# Patient Record
Sex: FEMALE | Race: ASIAN | NOT HISPANIC OR LATINO
[De-identification: names, ages, dates, MRNs, and addresses within clinical notes are randomized per-mention and may not be internally consistent; named-entity substitution may affect disease eponyms.]

---

## 2018-03-01 ENCOUNTER — APPOINTMENT (OUTPATIENT)
Dept: CARDIOLOGY | Facility: CLINIC | Age: 68
End: 2018-03-01
Payer: MEDICARE

## 2018-03-01 ENCOUNTER — NON-APPOINTMENT (OUTPATIENT)
Age: 68
End: 2018-03-01

## 2018-03-01 VITALS
HEIGHT: 59 IN | BODY MASS INDEX: 25.6 KG/M2 | HEART RATE: 55 BPM | OXYGEN SATURATION: 98 % | SYSTOLIC BLOOD PRESSURE: 134 MMHG | WEIGHT: 127 LBS | DIASTOLIC BLOOD PRESSURE: 78 MMHG

## 2018-03-01 DIAGNOSIS — Z82.49 FAMILY HISTORY OF ISCHEMIC HEART DISEASE AND OTHER DISEASES OF THE CIRCULATORY SYSTEM: ICD-10-CM

## 2018-03-01 PROCEDURE — 99204 OFFICE O/P NEW MOD 45 MIN: CPT

## 2018-03-01 PROCEDURE — 93306 TTE W/DOPPLER COMPLETE: CPT

## 2018-03-01 PROCEDURE — 93000 ELECTROCARDIOGRAM COMPLETE: CPT

## 2018-03-19 ENCOUNTER — APPOINTMENT (OUTPATIENT)
Dept: CARDIOLOGY | Facility: CLINIC | Age: 68
End: 2018-03-19

## 2018-03-19 ENCOUNTER — OUTPATIENT (OUTPATIENT)
Dept: OUTPATIENT SERVICES | Facility: HOSPITAL | Age: 68
LOS: 1 days | End: 2018-03-19
Payer: MEDICARE

## 2018-03-19 DIAGNOSIS — Z00.8 ENCOUNTER FOR OTHER GENERAL EXAMINATION: ICD-10-CM

## 2018-03-19 PROCEDURE — 93325 DOPPLER ECHO COLOR FLOW MAPG: CPT

## 2018-03-19 PROCEDURE — 93325 DOPPLER ECHO COLOR FLOW MAPG: CPT | Mod: 26

## 2018-03-19 PROCEDURE — 93017 CV STRESS TEST TRACING ONLY: CPT

## 2018-03-19 PROCEDURE — 93350 STRESS TTE ONLY: CPT | Mod: 26

## 2018-03-19 PROCEDURE — 93016 CV STRESS TEST SUPVJ ONLY: CPT

## 2018-03-19 PROCEDURE — 93018 CV STRESS TEST I&R ONLY: CPT

## 2018-03-19 PROCEDURE — 93320 DOPPLER ECHO COMPLETE: CPT | Mod: 26

## 2018-03-19 PROCEDURE — 93320 DOPPLER ECHO COMPLETE: CPT

## 2018-03-19 PROCEDURE — 93351 STRESS TTE COMPLETE: CPT

## 2018-07-03 ENCOUNTER — APPOINTMENT (OUTPATIENT)
Dept: CARDIOLOGY | Facility: CLINIC | Age: 68
End: 2018-07-03
Payer: MEDICARE

## 2018-07-03 ENCOUNTER — NON-APPOINTMENT (OUTPATIENT)
Age: 68
End: 2018-07-03

## 2018-07-03 VITALS
DIASTOLIC BLOOD PRESSURE: 72 MMHG | HEIGHT: 59 IN | BODY MASS INDEX: 26.41 KG/M2 | WEIGHT: 131 LBS | HEART RATE: 62 BPM | SYSTOLIC BLOOD PRESSURE: 116 MMHG | OXYGEN SATURATION: 99 %

## 2018-07-03 DIAGNOSIS — R06.02 SHORTNESS OF BREATH: ICD-10-CM

## 2018-07-03 PROCEDURE — 99214 OFFICE O/P EST MOD 30 MIN: CPT

## 2018-07-03 PROCEDURE — 93000 ELECTROCARDIOGRAM COMPLETE: CPT

## 2018-11-06 ENCOUNTER — NON-APPOINTMENT (OUTPATIENT)
Age: 68
End: 2018-11-06

## 2018-11-06 ENCOUNTER — APPOINTMENT (OUTPATIENT)
Dept: CARDIOLOGY | Facility: CLINIC | Age: 68
End: 2018-11-06
Payer: MEDICARE

## 2018-11-06 VITALS
HEART RATE: 51 BPM | DIASTOLIC BLOOD PRESSURE: 76 MMHG | SYSTOLIC BLOOD PRESSURE: 126 MMHG | WEIGHT: 130 LBS | HEIGHT: 59 IN | BODY MASS INDEX: 26.21 KG/M2 | OXYGEN SATURATION: 95 %

## 2018-11-06 DIAGNOSIS — Z86.39 PERSONAL HISTORY OF OTHER ENDOCRINE, NUTRITIONAL AND METABOLIC DISEASE: ICD-10-CM

## 2018-11-06 PROCEDURE — 93000 ELECTROCARDIOGRAM COMPLETE: CPT

## 2018-11-06 PROCEDURE — 99214 OFFICE O/P EST MOD 30 MIN: CPT

## 2018-11-06 NOTE — PHYSICAL EXAM
[General Appearance - Well Developed] : well developed [Normal Appearance] : normal appearance [Well Groomed] : well groomed [General Appearance - Well Nourished] : well nourished [No Deformities] : no deformities [General Appearance - In No Acute Distress] : no acute distress [Normal Conjunctiva] : the conjunctiva exhibited no abnormalities [Eyelids - No Xanthelasma] : the eyelids demonstrated no xanthelasmas [Normal Oral Mucosa] : normal oral mucosa [No Oral Pallor] : no oral pallor [No Oral Cyanosis] : no oral cyanosis [Respiration, Rhythm And Depth] : normal respiratory rhythm and effort [Auscultation Breath Sounds / Voice Sounds] : lungs were clear to auscultation bilaterally [Heart Rate And Rhythm] : heart rate and rhythm were normal [Heart Sounds] : normal S1 and S2 [Arterial Pulses Normal] : the arterial pulses were normal [Edema] : no peripheral edema present [Bowel Sounds] : normal bowel sounds [Abdomen Soft] : soft [Abdomen Tenderness] : non-tender [Abnormal Walk] : normal gait [Nail Clubbing] : no clubbing of the fingernails [Cyanosis, Localized] : no localized cyanosis [Skin Color & Pigmentation] : normal skin color and pigmentation [Skin Turgor] : normal skin turgor [] : no rash [Oriented To Time, Place, And Person] : oriented to person, place, and time [Impaired Insight] : insight and judgment were intact [No Anxiety] : not feeling anxious [FreeTextEntry1] : 2/6 PSM at left sternal boarder.

## 2018-11-06 NOTE — HISTORY OF PRESENT ILLNESS
[FreeTextEntry1] : Edita Stubbs is a 68 year old female with history of hypertension and abnormal EKG comes for follow up visit. Denies any chest pain or palpitations. No shortness of breath on exertion. Compliant to medications and diet. Physically active. Follows up with PCP.

## 2018-11-06 NOTE — REVIEW OF SYSTEMS
[Eyeglasses] : currently wearing eyeglasses [Negative] : Endocrine [Blurry Vision] : no blurred vision [Seeing Double (Diplopia)] : no diplopia [Eye Pain] : no eye pain [Shortness Of Breath] : no shortness of breath [Chest Pain] : no chest pain [Lower Ext Edema] : no extremity edema [Palpitations] : no palpitations [Easy Bleeding] : no tendency for easy bleeding [Easy Bruising] : no tendency for easy bruising

## 2018-11-06 NOTE — DISCUSSION/SUMMARY
[FreeTextEntry1] : Edita Stubbs is an elderly female with hypertension, controlled. Continue current medications and 2 gm sodium diet. LDL goal less than 130 g/dL. Abnormal EKG, changes are old. Continue healthy lifestyle. Follow up in 6 months.

## 2018-11-06 NOTE — REASON FOR VISIT
[Follow-Up - Clinic] : a clinic follow-up of [Abnormal ECG] : an abnormal ECG [Hypertension] : hypertension

## 2019-05-09 ENCOUNTER — NON-APPOINTMENT (OUTPATIENT)
Age: 69
End: 2019-05-09

## 2019-05-09 ENCOUNTER — APPOINTMENT (OUTPATIENT)
Dept: CARDIOLOGY | Facility: CLINIC | Age: 69
End: 2019-05-09
Payer: MEDICARE

## 2019-05-09 VITALS
HEIGHT: 59 IN | WEIGHT: 123.38 LBS | SYSTOLIC BLOOD PRESSURE: 128 MMHG | OXYGEN SATURATION: 97 % | DIASTOLIC BLOOD PRESSURE: 76 MMHG | HEART RATE: 54 BPM | BODY MASS INDEX: 24.87 KG/M2

## 2019-05-09 PROCEDURE — 93306 TTE W/DOPPLER COMPLETE: CPT

## 2019-05-09 PROCEDURE — 93000 ELECTROCARDIOGRAM COMPLETE: CPT

## 2019-05-09 PROCEDURE — 99214 OFFICE O/P EST MOD 30 MIN: CPT

## 2019-05-09 NOTE — REVIEW OF SYSTEMS
[Eyeglasses] : currently wearing eyeglasses [Negative] : Endocrine [Blurry Vision] : no blurred vision [Seeing Double (Diplopia)] : no diplopia [Shortness Of Breath] : no shortness of breath [Eye Pain] : no eye pain [Chest Pain] : no chest pain [Lower Ext Edema] : no extremity edema [Easy Bleeding] : no tendency for easy bleeding [Palpitations] : no palpitations [Easy Bruising] : no tendency for easy bruising

## 2019-05-09 NOTE — DISCUSSION/SUMMARY
[FreeTextEntry1] : In a summary Edita Stubbs is an elderly female with hypertension, controlled. Continue current medications and 2 gm sodium diet. Abnormal EKG, changes are old. ? LV aneurysm on CT abdomen, echo done showed normal LV systolic function and no LV aneurysm noted. Discussed with Ms. York and her PCP Dr. Sargent. Continue healthy lifestyle. Follow up in 6 months.

## 2019-05-09 NOTE — PHYSICAL EXAM
[General Appearance - Well Developed] : well developed [Well Groomed] : well groomed [Normal Appearance] : normal appearance [No Deformities] : no deformities [General Appearance - Well Nourished] : well nourished [Normal Conjunctiva] : the conjunctiva exhibited no abnormalities [General Appearance - In No Acute Distress] : no acute distress [Eyelids - No Xanthelasma] : the eyelids demonstrated no xanthelasmas [Normal Oral Mucosa] : normal oral mucosa [No Oral Pallor] : no oral pallor [No Oral Cyanosis] : no oral cyanosis [Auscultation Breath Sounds / Voice Sounds] : lungs were clear to auscultation bilaterally [Respiration, Rhythm And Depth] : normal respiratory rhythm and effort [Heart Sounds] : normal S1 and S2 [Heart Rate And Rhythm] : heart rate and rhythm were normal [Edema] : no peripheral edema present [Arterial Pulses Normal] : the arterial pulses were normal [Bowel Sounds] : normal bowel sounds [Abdomen Soft] : soft [Abdomen Tenderness] : non-tender [Abnormal Walk] : normal gait [Nail Clubbing] : no clubbing of the fingernails [Cyanosis, Localized] : no localized cyanosis [Skin Color & Pigmentation] : normal skin color and pigmentation [Skin Turgor] : normal skin turgor [] : no rash [Impaired Insight] : insight and judgment were intact [Oriented To Time, Place, And Person] : oriented to person, place, and time [No Anxiety] : not feeling anxious [FreeTextEntry1] : 2/6 PSM at left sternal boarder.

## 2019-05-09 NOTE — HISTORY OF PRESENT ILLNESS
[FreeTextEntry1] : Edita Stubbs is a 68 year old female with history of hypertension and abnormal EKG comes for follow up visit. Denies any chest pain or shortness of breath. No palpitations. Compliant to medications and diet. Had CT scan of abdomen and Pelvis for hematuria in March 2019  and showed LVH and possible LV aneurysm. Compliant to medications and diet. Follows up with PCP.

## 2019-11-14 ENCOUNTER — APPOINTMENT (OUTPATIENT)
Dept: CARDIOLOGY | Facility: CLINIC | Age: 69
End: 2019-11-14

## 2020-02-05 ENCOUNTER — APPOINTMENT (OUTPATIENT)
Dept: FAMILY MEDICINE | Facility: CLINIC | Age: 70
End: 2020-02-05
Payer: MEDICARE

## 2020-02-05 ENCOUNTER — NON-APPOINTMENT (OUTPATIENT)
Age: 70
End: 2020-02-05

## 2020-02-05 VITALS
TEMPERATURE: 98.2 F | SYSTOLIC BLOOD PRESSURE: 148 MMHG | HEART RATE: 54 BPM | WEIGHT: 128 LBS | HEIGHT: 59 IN | RESPIRATION RATE: 15 BRPM | BODY MASS INDEX: 25.8 KG/M2 | DIASTOLIC BLOOD PRESSURE: 82 MMHG | OXYGEN SATURATION: 98 %

## 2020-02-05 LAB
BILIRUB UR QL STRIP: NEGATIVE
CLARITY UR: CLEAR
COLLECTION METHOD: NORMAL
GLUCOSE UR-MCNC: NEGATIVE
HCG UR QL: 0.2 EU/DL
HGB UR QL STRIP.AUTO: ABNORMAL
KETONES UR-MCNC: NEGATIVE
LEUKOCYTE ESTERASE UR QL STRIP: NEGATIVE
NITRITE UR QL STRIP: NEGATIVE
PH UR STRIP: 7
PROT UR STRIP-MCNC: NEGATIVE
SP GR UR STRIP: 1.01

## 2020-02-05 PROCEDURE — G0439: CPT

## 2020-02-05 PROCEDURE — 90670 PCV13 VACCINE IM: CPT

## 2020-02-05 PROCEDURE — 93000 ELECTROCARDIOGRAM COMPLETE: CPT

## 2020-02-05 PROCEDURE — G0009: CPT

## 2020-02-05 PROCEDURE — 81003 URINALYSIS AUTO W/O SCOPE: CPT | Mod: QW

## 2020-02-05 NOTE — HISTORY OF PRESENT ILLNESS
[FreeTextEntry1] : HARVEY is a New patient here to establish primary care\par Physical exam today \par HARVEY is not fasting \par pneumo shot today \par pt would like to discuss HTN and BP medication

## 2020-02-05 NOTE — PHYSICAL EXAM
[No Acute Distress] : no acute distress [Well Nourished] : well nourished [Well-Appearing] : well-appearing [Normal Sclera/Conjunctiva] : normal sclera/conjunctiva [Well Developed] : well developed [EOMI] : extraocular movements intact [PERRL] : pupils equal round and reactive to light [Fundoscopic Exam Performed] : fundoscopic ~T exam ~C was performed [Normal Outer Ear/Nose] : the outer ears and nose were normal in appearance [Normal Oropharynx] : the oropharynx was normal [Normal TMs] : both tympanic membranes were normal [No JVD] : no jugular venous distention [No Lymphadenopathy] : no lymphadenopathy [No Respiratory Distress] : no respiratory distress  [Supple] : supple [Normal Rate] : normal rate  [No Accessory Muscle Use] : no accessory muscle use [Clear to Auscultation] : lungs were clear to auscultation bilaterally [No Murmur] : no murmur heard [Regular Rhythm] : with a regular rhythm [Normal S1, S2] : normal S1 and S2 [Soft] : abdomen soft [Non Tender] : non-tender [Non-distended] : non-distended [Normal Posterior Cervical Nodes] : no posterior cervical lymphadenopathy [Normal Anterior Cervical Nodes] : no anterior cervical lymphadenopathy [No Joint Swelling] : no joint swelling [Grossly Normal Strength/Tone] : grossly normal strength/tone [No Rash] : no rash [Coordination Grossly Intact] : coordination grossly intact [No Focal Deficits] : no focal deficits [Normal Gait] : normal gait [Normal Affect] : the affect was normal [Deep Tendon Reflexes (DTR)] : deep tendon reflexes were 2+ and symmetric [Normal Insight/Judgement] : insight and judgment were intact

## 2020-02-05 NOTE — HEALTH RISK ASSESSMENT
[No] : In the past 12 months have you used drugs other than those required for medical reasons? No [0] : 2) Feeling down, depressed, or hopeless: Not at all (0) [Patient reported mammogram was normal] : Patient reported mammogram was normal [Patient reported colonoscopy was normal] : Patient reported colonoscopy was normal [] : No [Audit-CScore] : 0 [[id number]] : 0 [MammogramComments] : [place]  [MammogramDate] : 08/19 [ColonoscopyDate] : 01/16

## 2020-02-06 ENCOUNTER — APPOINTMENT (OUTPATIENT)
Dept: FAMILY MEDICINE | Facility: CLINIC | Age: 70
End: 2020-02-06
Payer: MEDICARE

## 2020-02-06 PROCEDURE — 36415 COLL VENOUS BLD VENIPUNCTURE: CPT

## 2020-02-15 LAB
ALBUMIN SERPL ELPH-MCNC: 4.9 G/DL
ALP BLD-CCNC: 66 U/L
ALT SERPL-CCNC: 27 U/L
ANION GAP SERPL CALC-SCNC: 17 MMOL/L
AST SERPL-CCNC: 20 U/L
BASOPHILS # BLD AUTO: 0.05 K/UL
BASOPHILS NFR BLD AUTO: 0.4 %
BILIRUB SERPL-MCNC: 0.9 MG/DL
BUN SERPL-MCNC: 14 MG/DL
CALCIUM SERPL-MCNC: 10.8 MG/DL
CHLORIDE SERPL-SCNC: 100 MMOL/L
CHOLEST SERPL-MCNC: 207 MG/DL
CHOLEST/HDLC SERPL: 2.7 RATIO
CO2 SERPL-SCNC: 25 MMOL/L
CREAT SERPL-MCNC: 0.89 MG/DL
EOSINOPHIL # BLD AUTO: 0.19 K/UL
EOSINOPHIL NFR BLD AUTO: 1.7 %
ESTIMATED AVERAGE GLUCOSE: 108 MG/DL
GLUCOSE SERPL-MCNC: 98 MG/DL
HBA1C MFR BLD HPLC: 5.4 %
HCT VFR BLD CALC: 39.4 %
HDLC SERPL-MCNC: 78 MG/DL
HGB BLD-MCNC: 12.7 G/DL
IMM GRANULOCYTES NFR BLD AUTO: 0.2 %
LDLC SERPL CALC-MCNC: 112 MG/DL
LYMPHOCYTES # BLD AUTO: 3.36 K/UL
LYMPHOCYTES NFR BLD AUTO: 29.3 %
MAN DIFF?: NORMAL
MCHC RBC-ENTMCNC: 30.8 PG
MCHC RBC-ENTMCNC: 32.2 GM/DL
MCV RBC AUTO: 95.6 FL
MONOCYTES # BLD AUTO: 0.89 K/UL
MONOCYTES NFR BLD AUTO: 7.8 %
NEUTROPHILS # BLD AUTO: 6.97 K/UL
NEUTROPHILS NFR BLD AUTO: 60.6 %
PLATELET # BLD AUTO: 228 K/UL
POTASSIUM SERPL-SCNC: 4.5 MMOL/L
PROT SERPL-MCNC: 7.7 G/DL
RBC # BLD: 4.12 M/UL
RBC # FLD: 13.1 %
SODIUM SERPL-SCNC: 142 MMOL/L
TRIGL SERPL-MCNC: 90 MG/DL
TSH SERPL-ACNC: 2.25 UIU/ML
WBC # FLD AUTO: 11.48 K/UL

## 2020-06-08 ENCOUNTER — NON-APPOINTMENT (OUTPATIENT)
Age: 70
End: 2020-06-08

## 2020-06-08 ENCOUNTER — APPOINTMENT (OUTPATIENT)
Dept: FAMILY MEDICINE | Facility: CLINIC | Age: 70
End: 2020-06-08
Payer: MEDICARE

## 2020-06-08 ENCOUNTER — INPATIENT (INPATIENT)
Facility: HOSPITAL | Age: 70
LOS: 2 days | Discharge: HOME HEALTH | End: 2020-06-11
Payer: MEDICARE

## 2020-06-08 VITALS
OXYGEN SATURATION: 97 % | RESPIRATION RATE: 15 BRPM | HEART RATE: 127 BPM | TEMPERATURE: 98 F | BODY MASS INDEX: 25.8 KG/M2 | SYSTOLIC BLOOD PRESSURE: 110 MMHG | HEIGHT: 59 IN | WEIGHT: 128 LBS | DIASTOLIC BLOOD PRESSURE: 86 MMHG

## 2020-06-08 PROCEDURE — 99285 EMERGENCY DEPT VISIT HI MDM: CPT

## 2020-06-08 PROCEDURE — 70450 CT HEAD/BRAIN W/O DYE: CPT | Mod: 26

## 2020-06-08 PROCEDURE — 93000 ELECTROCARDIOGRAM COMPLETE: CPT

## 2020-06-08 PROCEDURE — 71045 X-RAY EXAM CHEST 1 VIEW: CPT | Mod: 26

## 2020-06-08 PROCEDURE — 99215 OFFICE O/P EST HI 40 MIN: CPT | Mod: 25

## 2020-06-08 PROCEDURE — 70496 CT ANGIOGRAPHY HEAD: CPT | Mod: 26

## 2020-06-08 PROCEDURE — 70498 CT ANGIOGRAPHY NECK: CPT | Mod: 26

## 2020-06-09 PROCEDURE — 99222 1ST HOSP IP/OBS MODERATE 55: CPT

## 2020-06-09 PROCEDURE — 70551 MRI BRAIN STEM W/O DYE: CPT | Mod: 26

## 2020-06-09 NOTE — PLAN
[FreeTextEntry1] : Advised HARVEY that she could have been having a TIA. New Atrial flutter noted on EKG. Discussed with HARVEY the need for immediate workup. Discussed importance of immediate workup and anticoagulation to prevent stroke. HARVEY vocalized understanding. [facility] was called ahead to receive HARVEY.

## 2020-06-09 NOTE — PHYSICAL EXAM
[No Acute Distress] : no acute distress [Well Nourished] : well nourished [Well Developed] : well developed [Well-Appearing] : well-appearing [Normal Sclera/Conjunctiva] : normal sclera/conjunctiva [PERRL] : pupils equal round and reactive to light [EOMI] : extraocular movements intact [Normal Outer Ear/Nose] : the outer ears and nose were normal in appearance [Normal Oropharynx] : the oropharynx was normal [No JVD] : no jugular venous distention [No Lymphadenopathy] : no lymphadenopathy [Supple] : supple [No Respiratory Distress] : no respiratory distress  [Clear to Auscultation] : lungs were clear to auscultation bilaterally [No Accessory Muscle Use] : no accessory muscle use [Normal S1, S2] : normal S1 and S2 [No Murmur] : no murmur heard [Soft] : abdomen soft [Non Tender] : non-tender [Non-distended] : non-distended [Normal Posterior Cervical Nodes] : no posterior cervical lymphadenopathy [Normal Anterior Cervical Nodes] : no anterior cervical lymphadenopathy [No Joint Swelling] : no joint swelling [Grossly Normal Strength/Tone] : grossly normal strength/tone [No Rash] : no rash [Coordination Grossly Intact] : coordination grossly intact [Normal Gait] : normal gait [No Focal Deficits] : no focal deficits [Deep Tendon Reflexes (DTR)] : deep tendon reflexes were 2+ and symmetric [Normal Insight/Judgement] : insight and judgment were intact [Normal Affect] : the affect was normal [Normal] : the deep tendon reflexes were normal [Tachycardia] : tachycardic [Irregularly Irregular] : irregularly irregular

## 2020-06-09 NOTE — REVIEW OF SYSTEMS
[Fainting] : fainting [Negative] : Heme/Lymph [de-identified] : slurred speech and right sided face drooping.

## 2020-06-09 NOTE — HISTORY OF PRESENT ILLNESS
[Family Member] : family member [FreeTextEntry8] : HARVEY is here for a dizzy spell from yesterday evening. \par HARVEY states she could not stand or talk (slurring words and right side of face "was off center") and then it went away after 30 minutes. \par Currently she denies any residual effects, headache, dizziness, change in vision, weakness and numbness. \par

## 2020-06-10 PROCEDURE — 93306 TTE W/DOPPLER COMPLETE: CPT | Mod: 26

## 2020-06-10 PROCEDURE — 99233 SBSQ HOSP IP/OBS HIGH 50: CPT

## 2020-06-11 PROCEDURE — 99222 1ST HOSP IP/OBS MODERATE 55: CPT

## 2020-06-11 PROCEDURE — 99232 SBSQ HOSP IP/OBS MODERATE 35: CPT

## 2020-06-14 ENCOUNTER — EMERGENCY (EMERGENCY)
Facility: HOSPITAL | Age: 70
LOS: 1 days | End: 2020-06-14
Admitting: EMERGENCY MEDICINE
Payer: MEDICARE

## 2020-06-14 PROCEDURE — 71045 X-RAY EXAM CHEST 1 VIEW: CPT | Mod: 26

## 2020-06-14 PROCEDURE — 99285 EMERGENCY DEPT VISIT HI MDM: CPT

## 2020-06-15 ENCOUNTER — APPOINTMENT (OUTPATIENT)
Dept: FAMILY MEDICINE | Facility: CLINIC | Age: 70
End: 2020-06-15
Payer: MEDICARE

## 2020-06-15 ENCOUNTER — TRANSCRIPTION ENCOUNTER (OUTPATIENT)
Age: 70
End: 2020-06-15

## 2020-06-15 VITALS
HEART RATE: 44 BPM | BODY MASS INDEX: 21.77 KG/M2 | SYSTOLIC BLOOD PRESSURE: 102 MMHG | OXYGEN SATURATION: 98 % | WEIGHT: 108 LBS | RESPIRATION RATE: 16 BRPM | TEMPERATURE: 98.6 F | DIASTOLIC BLOOD PRESSURE: 84 MMHG | HEIGHT: 59 IN

## 2020-06-15 PROCEDURE — 99214 OFFICE O/P EST MOD 30 MIN: CPT

## 2020-06-15 RX ORDER — LOSARTAN POTASSIUM 100 MG/1
100 TABLET, FILM COATED ORAL
Qty: 90 | Refills: 0 | Status: DISCONTINUED | COMMUNITY
Start: 2017-06-23 | End: 2020-06-15

## 2020-06-15 RX ORDER — ATENOLOL 50 MG/1
50 TABLET ORAL
Qty: 90 | Refills: 0 | Status: DISCONTINUED | COMMUNITY
Start: 2017-06-23 | End: 2020-06-15

## 2020-06-15 RX ORDER — AMLODIPINE BESYLATE 10 MG/1
10 TABLET ORAL
Qty: 90 | Refills: 0 | Status: DISCONTINUED | COMMUNITY
Start: 2017-05-14 | End: 2020-06-15

## 2020-06-15 NOTE — PHYSICAL EXAM
[No Acute Distress] : no acute distress [Well-Appearing] : well-appearing [Well Developed] : well developed [Well Nourished] : well nourished [EOMI] : extraocular movements intact [PERRL] : pupils equal round and reactive to light [Normal Sclera/Conjunctiva] : normal sclera/conjunctiva [No JVD] : no jugular venous distention [Normal Oropharynx] : the oropharynx was normal [Normal Outer Ear/Nose] : the outer ears and nose were normal in appearance [Supple] : supple [No Respiratory Distress] : no respiratory distress  [No Lymphadenopathy] : no lymphadenopathy [Normal S1, S2] : normal S1 and S2 [Clear to Auscultation] : lungs were clear to auscultation bilaterally [No Accessory Muscle Use] : no accessory muscle use [No Murmur] : no murmur heard [Tachycardia] : tachycardic [Irregularly Irregular] : irregularly irregular [Non Tender] : non-tender [Soft] : abdomen soft [Non-distended] : non-distended [Normal Posterior Cervical Nodes] : no posterior cervical lymphadenopathy [Normal Anterior Cervical Nodes] : no anterior cervical lymphadenopathy [No Joint Swelling] : no joint swelling [Grossly Normal Strength/Tone] : grossly normal strength/tone [Coordination Grossly Intact] : coordination grossly intact [No Rash] : no rash [Deep Tendon Reflexes (DTR)] : deep tendon reflexes were 2+ and symmetric [No Focal Deficits] : no focal deficits [Normal Gait] : normal gait [Normal Insight/Judgement] : insight and judgment were intact [Normal] : the deep tendon reflexes were normal [Normal Affect] : the affect was normal

## 2020-06-17 ENCOUNTER — APPOINTMENT (OUTPATIENT)
Dept: CARDIOLOGY | Facility: CLINIC | Age: 70
End: 2020-06-17
Payer: MEDICARE

## 2020-06-17 VITALS
WEIGHT: 128 LBS | SYSTOLIC BLOOD PRESSURE: 132 MMHG | DIASTOLIC BLOOD PRESSURE: 68 MMHG | OXYGEN SATURATION: 98 % | HEART RATE: 58 BPM | BODY MASS INDEX: 25.8 KG/M2 | HEIGHT: 59 IN

## 2020-06-17 PROCEDURE — 99214 OFFICE O/P EST MOD 30 MIN: CPT

## 2020-06-17 RX ORDER — METOPROLOL SUCCINATE 100 MG/1
100 TABLET, EXTENDED RELEASE ORAL
Qty: 90 | Refills: 0 | Status: DISCONTINUED | COMMUNITY
Start: 2020-06-15 | End: 2020-06-17

## 2020-06-17 RX ORDER — METOPROLOL SUCCINATE 25 MG/1
25 TABLET, EXTENDED RELEASE ORAL
Qty: 90 | Refills: 0 | Status: DISCONTINUED | COMMUNITY
Start: 2020-06-15 | End: 2020-06-17

## 2020-06-17 NOTE — HISTORY OF PRESENT ILLNESS
[FreeTextEntry1] : 70 years old female patient looking younger than her stated age came for [facility] [place]up.\par \par She was admitted to [facility] on July 2020 after episode of dizziness and dysarthria, work-up confirmed CVA most likely embolic from atrial fibrillation; she was not cardioverted due to stroke; she was started on Eliquis and rate was controlled with diltiazem and discharged home safely.  She had no residual neuro deficit.\par \par She came back to emergency room on 6/14/2020 for atypical chest pain, troponin x 2 were negative and she was discharged home for outpatient follow-up.\par \par Since discharge she has no chest pain, shortness of breath, PND, orthopnea, diaphoresis, dizziness, palpitations, cardiac in systems, no neurological symptoms.\par \par She is compliant medication low-cholesterol diet.

## 2020-06-17 NOTE — ASSESSMENT
[FreeTextEntry1] : CVA -most likely embolic continue Eliquis\par \par Atrial flutter-controlled ventricular response rate, continue diltiazem and metoprolol for rate control; continue Eliquis to prevent thromboembolism\par \par Dyslipidemia -low-cholesterol diet, continue medications\par \par Hypertension -low-salt diet, continue medications\par \par Aggressive risk factor monitor has been discussed with a great length.  She will be re-eval by me in 2 months and at that time will make a decision of cardioversion.

## 2020-06-24 ENCOUNTER — RX RENEWAL (OUTPATIENT)
Age: 70
End: 2020-06-24

## 2020-07-07 NOTE — HISTORY OF PRESENT ILLNESS
[FreeTextEntry2] : HARVEY states she went to [facility] on 6/8/2020-6/11/2020 and had a stroke \par then was rehospitalized in [facility] on 6/14/2020 for tightness in chest \par HARVEY follows with Dr. Roberson. WIll discuss Eliquis vs Coumadin due to cost.

## 2020-07-07 NOTE — PLAN
[FreeTextEntry1] : HARVEY stayed 3 days for possible TIA and 1 day for chest tightness. HARVEY has to follow up with cardiology. HARVEY would like to change her eliquis to a cheaper one such as coumadin.

## 2020-07-08 ENCOUNTER — APPOINTMENT (OUTPATIENT)
Dept: ELECTROPHYSIOLOGY | Facility: CLINIC | Age: 70
End: 2020-07-08
Payer: MEDICARE

## 2020-07-08 VITALS
DIASTOLIC BLOOD PRESSURE: 62 MMHG | TEMPERATURE: 98.8 F | OXYGEN SATURATION: 95 % | HEART RATE: 81 BPM | HEIGHT: 59 IN | SYSTOLIC BLOOD PRESSURE: 128 MMHG | WEIGHT: 130 LBS | BODY MASS INDEX: 26.21 KG/M2

## 2020-07-08 PROCEDURE — 99214 OFFICE O/P EST MOD 30 MIN: CPT

## 2020-07-13 RX ORDER — ATORVASTATIN CALCIUM 20 MG/1
20 TABLET, FILM COATED ORAL
Qty: 90 | Refills: 0 | Status: DISCONTINUED | COMMUNITY
Start: 2020-03-23

## 2020-07-14 NOTE — REVIEW OF SYSTEMS
[Sore Throat] : no sore throat [Feeling Fatigued] : not feeling fatigued [Cough] : no cough [see HPI] : see HPI [Abdominal Pain] : no abdominal pain [Dizziness] : no dizziness [Easy Bleeding] : no tendency for easy bleeding [Skin: A Rash] : no rash: [Easy Bruising] : no tendency for easy bruising [Negative] : Eyes

## 2020-07-14 NOTE — HISTORY OF PRESENT ILLNESS
[FreeTextEntry1] : The patient is a 70-year-old woman who is being seen in an evaluation.  She was recently admitted to [facility] July 2012 after an episode of dizziness and dysarthria.  Her evaluation suggested that she had a CVA.  She was noted to be in atrial fibrillation and was started on Eliquis and rate controlled with diltiazem.  She was discharged home with fortunately no residual deficit.\par \par The patient returned to the emergency room June 14, 2020 with atypical chest pain and had 2 negative  troponins and was discharged home for follow-up evaluation.  She was noted to be in atrial fibrillation/flutter.\par \par She had an echocardiogram performed June 9, 2020 that showed normal ventricular function, left atrial enlargement, septal hypertrophy.  It was reported as a technically difficult study.\par \par Follow-up evaluation revealed the patient was still in A. fib.\par \par She denies palpitations, dizziness, lightheadedness, syncope or presyncope.  She had a single bout of atypical chest pain for which he presented to ER but no recurrences.  She denies shortness of breath.  Her main symptoms is easy fatigability.  She does feel an increase in her heartbeat when she exerts herself or goes up and down steps.\par \par A prior stress echocardiogram performed in 2018 had shown normal systolic function, 7 METS and no evidence of inducible ischemia

## 2020-07-14 NOTE — PHYSICAL EXAM
[General Appearance - Well Developed] : well developed [General Appearance - In No Acute Distress] : no acute distress [No Oral Pallor] : no oral pallor [Normal Conjunctiva] : the conjunctiva exhibited no abnormalities [Normal Jugular Venous V Waves Present] : normal jugular venous V waves present [Heart Sounds] : normal S1 and S2 [Murmurs] : no murmurs present [Respiration, Rhythm And Depth] : normal respiratory rhythm and effort [Arterial Pulses Normal] : the arterial pulses were normal [Edema] : no peripheral edema present [Auscultation Breath Sounds / Voice Sounds] : lungs were clear to auscultation bilaterally [Nail Clubbing] : no clubbing of the fingernails [Abdomen Tenderness] : non-tender [] : no rash [Impaired Insight] : insight and judgment were intact [Cyanosis, Localized] : no localized cyanosis

## 2020-07-14 NOTE — DISCUSSION/SUMMARY
[FreeTextEntry1] : This is a 70-year-old woman with a prior history of high blood pressure who had presented with an acute neurologic event thought to be related to A. fib and embolic stroke at that time she was started on anticoagulation.  She was not cardioverted because of the acute stroke.  Patient continued on anticoagulation with Eliquis and rate control with diltiazem as well as metoprolol.  She is now here as to be seen today for decision regarding restoration of sinus rhythm.  She does have symptoms of easy fatigability.\par \par I would recommend electrical cardioversion to restore sinus rhythm.  We would need to perform a EDWIN despite the fact she has been on Eliquis.  She may have recurrences after the cardioversion but at that time we would consider the option of antiarrhythmic agent.  When in sinus rhythm we would reassess her exercise capacity.\par \par I have explained the options to the patient: Either rate control and anticoagulate versus restoration of sinus rhythm.  Because of her symptoms and lack of rate control would favor restoration of sinus rhythm she would need a EDWIN prior to the procedure.  Chance of recurrent A. fib explained the potential need for antiarrhythmic also explained.  Patient wants to think about these options before undergoing to DEWIN cardioversion.  She will call the office when she is ready to have it scheduled.

## 2020-08-13 ENCOUNTER — APPOINTMENT (OUTPATIENT)
Dept: CARDIOLOGY | Facility: CLINIC | Age: 70
End: 2020-08-13
Payer: MEDICARE

## 2020-08-13 VITALS
WEIGHT: 131 LBS | HEART RATE: 61 BPM | TEMPERATURE: 97.8 F | DIASTOLIC BLOOD PRESSURE: 92 MMHG | SYSTOLIC BLOOD PRESSURE: 120 MMHG | OXYGEN SATURATION: 96 % | BODY MASS INDEX: 26.46 KG/M2

## 2020-08-13 DIAGNOSIS — Z09 ENCOUNTER FOR FOLLOW-UP EXAMINATION AFTER COMPLETED TREATMENT FOR CONDITIONS OTHER THAN MALIGNANT NEOPLASM: ICD-10-CM

## 2020-08-13 PROCEDURE — 99215 OFFICE O/P EST HI 40 MIN: CPT

## 2020-08-20 ENCOUNTER — APPOINTMENT (OUTPATIENT)
Dept: FAMILY MEDICINE | Facility: CLINIC | Age: 70
End: 2020-08-20
Payer: MEDICARE

## 2020-08-20 VITALS
WEIGHT: 131 LBS | TEMPERATURE: 98.4 F | DIASTOLIC BLOOD PRESSURE: 88 MMHG | SYSTOLIC BLOOD PRESSURE: 130 MMHG | HEART RATE: 83 BPM | BODY MASS INDEX: 26.41 KG/M2 | HEIGHT: 59 IN | OXYGEN SATURATION: 98 % | RESPIRATION RATE: 15 BRPM

## 2020-08-20 PROCEDURE — 99214 OFFICE O/P EST MOD 30 MIN: CPT

## 2020-08-20 NOTE — PHYSICAL EXAM
[No Acute Distress] : no acute distress [Well Nourished] : well nourished [Well Developed] : well developed [Well-Appearing] : well-appearing [Normal Sclera/Conjunctiva] : normal sclera/conjunctiva [EOMI] : extraocular movements intact [Normal Outer Ear/Nose] : the outer ears and nose were normal in appearance [PERRL] : pupils equal round and reactive to light [No JVD] : no jugular venous distention [Normal Oropharynx] : the oropharynx was normal [No Lymphadenopathy] : no lymphadenopathy [Supple] : supple [No Respiratory Distress] : no respiratory distress  [No Accessory Muscle Use] : no accessory muscle use [Clear to Auscultation] : lungs were clear to auscultation bilaterally [Normal S1, S2] : normal S1 and S2 [No Murmur] : no murmur heard [Tachycardia] : tachycardic [Irregularly Irregular] : irregularly irregular [Soft] : abdomen soft [Non Tender] : non-tender [Normal Posterior Cervical Nodes] : no posterior cervical lymphadenopathy [Non-distended] : non-distended [No Joint Swelling] : no joint swelling [Normal Anterior Cervical Nodes] : no anterior cervical lymphadenopathy [No Rash] : no rash [Grossly Normal Strength/Tone] : grossly normal strength/tone [Coordination Grossly Intact] : coordination grossly intact [Normal Gait] : normal gait [No Focal Deficits] : no focal deficits [Deep Tendon Reflexes (DTR)] : deep tendon reflexes were 2+ and symmetric [Normal] : coordination was normal [Normal Insight/Judgement] : insight and judgment were intact [Normal Affect] : the affect was normal

## 2020-08-20 NOTE — HISTORY OF PRESENT ILLNESS
[Family Member] : family member [FreeTextEntry1] : Patient is here for a follow up \par She wants to discuss cardio results/ upcoming procedure [de-identified] : Suggest that she goes for cardioversion.

## 2020-09-07 ENCOUNTER — APPOINTMENT (OUTPATIENT)
Dept: DISASTER EMERGENCY | Facility: CLINIC | Age: 70
End: 2020-09-07

## 2020-09-08 LAB — SARS-COV-2 N GENE NPH QL NAA+PROBE: NOT DETECTED

## 2020-09-10 ENCOUNTER — OUTPATIENT (OUTPATIENT)
Dept: INPATIENT UNIT | Facility: HOSPITAL | Age: 70
LOS: 1 days | End: 2020-09-10
Payer: MEDICARE

## 2020-09-10 PROCEDURE — 93320 DOPPLER ECHO COMPLETE: CPT | Mod: 26

## 2020-09-10 PROCEDURE — 92960 CARDIOVERSION ELECTRIC EXT: CPT

## 2020-09-10 PROCEDURE — 93010 ELECTROCARDIOGRAM REPORT: CPT | Mod: 77

## 2020-09-10 PROCEDURE — 93312 ECHO TRANSESOPHAGEAL: CPT | Mod: 26

## 2020-09-10 PROCEDURE — 93010 ELECTROCARDIOGRAM REPORT: CPT

## 2020-09-22 ENCOUNTER — RX RENEWAL (OUTPATIENT)
Age: 70
End: 2020-09-22

## 2020-09-23 ENCOUNTER — RX RENEWAL (OUTPATIENT)
Age: 70
End: 2020-09-23

## 2020-09-29 ENCOUNTER — NON-APPOINTMENT (OUTPATIENT)
Age: 70
End: 2020-09-29

## 2020-09-29 ENCOUNTER — APPOINTMENT (OUTPATIENT)
Dept: CARDIOLOGY | Facility: CLINIC | Age: 70
End: 2020-09-29
Payer: MEDICARE

## 2020-09-29 VITALS
OXYGEN SATURATION: 97 % | TEMPERATURE: 97.7 F | BODY MASS INDEX: 26.66 KG/M2 | DIASTOLIC BLOOD PRESSURE: 82 MMHG | SYSTOLIC BLOOD PRESSURE: 130 MMHG | WEIGHT: 132 LBS | HEART RATE: 64 BPM

## 2020-09-29 DIAGNOSIS — Z86.79 PERSONAL HISTORY OF OTHER DISEASES OF THE CIRCULATORY SYSTEM: ICD-10-CM

## 2020-09-29 PROCEDURE — 99215 OFFICE O/P EST HI 40 MIN: CPT

## 2020-09-29 PROCEDURE — 93000 ELECTROCARDIOGRAM COMPLETE: CPT

## 2020-10-05 ENCOUNTER — RX RENEWAL (OUTPATIENT)
Age: 70
End: 2020-10-05

## 2020-11-24 ENCOUNTER — APPOINTMENT (OUTPATIENT)
Dept: CARDIOLOGY | Facility: CLINIC | Age: 70
End: 2020-11-24
Payer: MEDICARE

## 2020-11-24 VITALS
HEART RATE: 74 BPM | WEIGHT: 130 LBS | DIASTOLIC BLOOD PRESSURE: 74 MMHG | OXYGEN SATURATION: 99 % | TEMPERATURE: 97.5 F | SYSTOLIC BLOOD PRESSURE: 114 MMHG | BODY MASS INDEX: 26.26 KG/M2

## 2020-11-24 PROCEDURE — 99214 OFFICE O/P EST MOD 30 MIN: CPT

## 2020-11-24 PROCEDURE — 93000 ELECTROCARDIOGRAM COMPLETE: CPT

## 2020-11-25 ENCOUNTER — NON-APPOINTMENT (OUTPATIENT)
Age: 70
End: 2020-11-25

## 2020-11-27 NOTE — PHYSICAL EXAM
[General Appearance - Well Developed] : well developed [Normal Appearance] : normal appearance done [Well Groomed] : well groomed [General Appearance - Well Nourished] : well nourished [General Appearance - In No Acute Distress] : no acute distress [No Deformities] : no deformities [Normal Conjunctiva] : the conjunctiva exhibited no abnormalities [No Oral Pallor] : no oral pallor [Normal Oral Mucosa] : normal oral mucosa [Eyelids - No Xanthelasma] : the eyelids demonstrated no xanthelasmas [Normal Jugular Venous A Waves Present] : normal jugular venous A waves present [No Oral Cyanosis] : no oral cyanosis [No Jugular Venous Barrow A Waves] : no jugular venous barrow A waves [Normal Jugular Venous V Waves Present] : normal jugular venous V waves present [Respiration, Rhythm And Depth] : normal respiratory rhythm and effort [Auscultation Breath Sounds / Voice Sounds] : lungs were clear to auscultation bilaterally [Exaggerated Use Of Accessory Muscles For Inspiration] : no accessory muscle use [Murmurs] : no murmurs present [Heart Rate And Rhythm] : heart rate and rhythm were normal [Heart Sounds] : normal S1 and S2 [Abdomen Soft] : soft [Abdomen Tenderness] : non-tender [Abdomen Mass (___ Cm)] : no abdominal mass palpated [Abnormal Walk] : normal gait [Gait - Sufficient For Exercise Testing] : the gait was sufficient for exercise testing [Nail Clubbing] : no clubbing of the fingernails [Cyanosis, Localized] : no localized cyanosis [Petechial Hemorrhages (___cm)] : no petechial hemorrhages [Skin Color & Pigmentation] : normal skin color and pigmentation [No Venous Stasis] : no venous stasis [] : no rash [No Skin Ulcers] : no skin ulcer [No Xanthoma] : no  xanthoma was observed [Skin Lesions] : no skin lesions [Oriented To Time, Place, And Person] : oriented to person, place, and time [No Anxiety] : not feeling anxious [Affect] : the affect was normal [Mood] : the mood was normal

## 2020-11-30 ENCOUNTER — APPOINTMENT (OUTPATIENT)
Dept: DISASTER EMERGENCY | Facility: CLINIC | Age: 70
End: 2020-11-30

## 2020-12-01 LAB — SARS-COV-2 N GENE NPH QL NAA+PROBE: NOT DETECTED

## 2020-12-03 ENCOUNTER — OUTPATIENT (OUTPATIENT)
Dept: INPATIENT UNIT | Facility: HOSPITAL | Age: 70
LOS: 1 days | End: 2020-12-03
Payer: MEDICARE

## 2020-12-03 PROCEDURE — 93010 ELECTROCARDIOGRAM REPORT: CPT

## 2020-12-21 ENCOUNTER — RX RENEWAL (OUTPATIENT)
Age: 70
End: 2020-12-21

## 2020-12-29 ENCOUNTER — APPOINTMENT (OUTPATIENT)
Dept: FAMILY MEDICINE | Facility: CLINIC | Age: 70
End: 2020-12-29
Payer: MEDICARE

## 2020-12-29 VITALS
RESPIRATION RATE: 16 BRPM | WEIGHT: 135 LBS | TEMPERATURE: 96.2 F | HEART RATE: 107 BPM | DIASTOLIC BLOOD PRESSURE: 90 MMHG | SYSTOLIC BLOOD PRESSURE: 126 MMHG | OXYGEN SATURATION: 99 % | HEIGHT: 59 IN | BODY MASS INDEX: 27.21 KG/M2

## 2020-12-29 PROCEDURE — 99214 OFFICE O/P EST MOD 30 MIN: CPT | Mod: 25

## 2020-12-29 PROCEDURE — 99072 ADDL SUPL MATRL&STAF TM PHE: CPT

## 2020-12-29 PROCEDURE — G0009: CPT

## 2020-12-29 PROCEDURE — 90732 PPSV23 VACC 2 YRS+ SUBQ/IM: CPT

## 2020-12-29 PROCEDURE — 36415 COLL VENOUS BLD VENIPUNCTURE: CPT

## 2020-12-29 NOTE — PLAN
[FreeTextEntry1] : Rx  amiodarone, atorvastatin, eliquis, metoprolol refilled \par Blood work obtained.\par Will call with results of blood work.\par Pneumo 23 given\par Will f/u with cardio with dry cough.

## 2020-12-29 NOTE — HISTORY OF PRESENT ILLNESS
[FreeTextEntry1] : HARVEY would like amiodarone, atorvastatin, eliquis, metoprolol refilled \par [facility]  [de-identified] : HARVEY states she feels otherwise in good health.\par She states she has a dry cough that started when she started the new medications- Diltiazem and spirnolactone\par

## 2020-12-29 NOTE — PHYSICAL EXAM
[No Acute Distress] : no acute distress [Well Nourished] : well nourished [Well Developed] : well developed [Well-Appearing] : well-appearing [Normal Sclera/Conjunctiva] : normal sclera/conjunctiva [PERRL] : pupils equal round and reactive to light [EOMI] : extraocular movements intact [Normal Outer Ear/Nose] : the outer ears and nose were normal in appearance [Normal Oropharynx] : the oropharynx was normal [No JVD] : no jugular venous distention [No Lymphadenopathy] : no lymphadenopathy [Supple] : supple [No Respiratory Distress] : no respiratory distress  [No Accessory Muscle Use] : no accessory muscle use [Clear to Auscultation] : lungs were clear to auscultation bilaterally [Normal S1, S2] : normal S1 and S2 [No Murmur] : no murmur heard [Tachycardia] : tachycardic [Irregularly Irregular] : irregularly irregular [Soft] : abdomen soft [Non Tender] : non-tender [Non-distended] : non-distended [Normal Posterior Cervical Nodes] : no posterior cervical lymphadenopathy [Normal Anterior Cervical Nodes] : no anterior cervical lymphadenopathy [No Joint Swelling] : no joint swelling [Grossly Normal Strength/Tone] : grossly normal strength/tone [No Rash] : no rash [Coordination Grossly Intact] : coordination grossly intact [No Focal Deficits] : no focal deficits [Normal Gait] : normal gait [Deep Tendon Reflexes (DTR)] : deep tendon reflexes were 2+ and symmetric [Normal Affect] : the affect was normal [Normal Insight/Judgement] : insight and judgment were intact

## 2020-12-31 ENCOUNTER — APPOINTMENT (OUTPATIENT)
Dept: CARDIOLOGY | Facility: CLINIC | Age: 70
End: 2020-12-31
Payer: MEDICARE

## 2020-12-31 ENCOUNTER — NON-APPOINTMENT (OUTPATIENT)
Age: 70
End: 2020-12-31

## 2020-12-31 VITALS
HEART RATE: 61 BPM | DIASTOLIC BLOOD PRESSURE: 70 MMHG | SYSTOLIC BLOOD PRESSURE: 108 MMHG | TEMPERATURE: 97.7 F | OXYGEN SATURATION: 98 % | WEIGHT: 135 LBS | BODY MASS INDEX: 27.27 KG/M2

## 2020-12-31 DIAGNOSIS — R42 DIZZINESS AND GIDDINESS: ICD-10-CM

## 2020-12-31 PROCEDURE — 99214 OFFICE O/P EST MOD 30 MIN: CPT

## 2020-12-31 PROCEDURE — 93000 ELECTROCARDIOGRAM COMPLETE: CPT

## 2020-12-31 PROCEDURE — 99072 ADDL SUPL MATRL&STAF TM PHE: CPT

## 2020-12-31 RX ORDER — METOPROLOL SUCCINATE 25 MG/1
25 TABLET, EXTENDED RELEASE ORAL
Qty: 90 | Refills: 0 | Status: DISCONTINUED | COMMUNITY
Start: 2020-10-05 | End: 2020-12-31

## 2021-01-15 ENCOUNTER — RX RENEWAL (OUTPATIENT)
Age: 71
End: 2021-01-15

## 2021-01-21 ENCOUNTER — TRANSCRIPTION ENCOUNTER (OUTPATIENT)
Age: 71
End: 2021-01-21

## 2021-01-21 LAB
ALBUMIN SERPL ELPH-MCNC: 5 G/DL
ALP BLD-CCNC: 85 U/L
ALT SERPL-CCNC: 16 U/L
ANION GAP SERPL CALC-SCNC: 14 MMOL/L
AST SERPL-CCNC: 22 U/L
BASOPHILS # BLD AUTO: 0.04 K/UL
BASOPHILS NFR BLD AUTO: 0.5 %
BILIRUB SERPL-MCNC: 0.4 MG/DL
BUN SERPL-MCNC: 28 MG/DL
CALCIUM SERPL-MCNC: 10.4 MG/DL
CHLORIDE SERPL-SCNC: 101 MMOL/L
CHOLEST SERPL-MCNC: 262 MG/DL
CO2 SERPL-SCNC: 24 MMOL/L
CREAT SERPL-MCNC: 1.5 MG/DL
EOSINOPHIL # BLD AUTO: 0.09 K/UL
EOSINOPHIL NFR BLD AUTO: 1.2 %
ESTIMATED AVERAGE GLUCOSE: 120 MG/DL
GLUCOSE SERPL-MCNC: 124 MG/DL
HBA1C MFR BLD HPLC: 5.8 %
HCT VFR BLD CALC: 42.6 %
HDLC SERPL-MCNC: 76 MG/DL
HGB BLD-MCNC: 14 G/DL
IMM GRANULOCYTES NFR BLD AUTO: 0.1 %
LDLC SERPL CALC-MCNC: 156 MG/DL
LYMPHOCYTES # BLD AUTO: 2.53 K/UL
LYMPHOCYTES NFR BLD AUTO: 33.4 %
MAN DIFF?: NORMAL
MCHC RBC-ENTMCNC: 31.5 PG
MCHC RBC-ENTMCNC: 32.9 GM/DL
MCV RBC AUTO: 95.7 FL
MONOCYTES # BLD AUTO: 0.55 K/UL
MONOCYTES NFR BLD AUTO: 7.3 %
NEUTROPHILS # BLD AUTO: 4.36 K/UL
NEUTROPHILS NFR BLD AUTO: 57.5 %
NONHDLC SERPL-MCNC: 185 MG/DL
PLATELET # BLD AUTO: 263 K/UL
POTASSIUM SERPL-SCNC: 5.4 MMOL/L
PROT SERPL-MCNC: 8.1 G/DL
RBC # BLD: 4.45 M/UL
RBC # FLD: 13.2 %
SODIUM SERPL-SCNC: 139 MMOL/L
TRIGL SERPL-MCNC: 147 MG/DL
TSH SERPL-ACNC: 1.76 UIU/ML
WBC # FLD AUTO: 7.58 K/UL

## 2021-01-25 ENCOUNTER — APPOINTMENT (OUTPATIENT)
Dept: ELECTROPHYSIOLOGY | Facility: CLINIC | Age: 71
End: 2021-01-25
Payer: MEDICARE

## 2021-01-25 ENCOUNTER — NON-APPOINTMENT (OUTPATIENT)
Age: 71
End: 2021-01-25

## 2021-01-25 VITALS
BODY MASS INDEX: 26.21 KG/M2 | HEIGHT: 59 IN | SYSTOLIC BLOOD PRESSURE: 126 MMHG | OXYGEN SATURATION: 96 % | HEART RATE: 79 BPM | TEMPERATURE: 98 F | DIASTOLIC BLOOD PRESSURE: 82 MMHG | WEIGHT: 130 LBS

## 2021-01-25 PROCEDURE — 99072 ADDL SUPL MATRL&STAF TM PHE: CPT

## 2021-01-25 PROCEDURE — 93000 ELECTROCARDIOGRAM COMPLETE: CPT

## 2021-01-25 PROCEDURE — 99214 OFFICE O/P EST MOD 30 MIN: CPT

## 2021-01-25 RX ORDER — DILTIAZEM HYDROCHLORIDE 180 MG/1
180 CAPSULE, EXTENDED RELEASE ORAL
Qty: 60 | Refills: 0 | Status: DISCONTINUED | COMMUNITY
Start: 2020-06-15 | End: 2021-01-25

## 2021-01-25 NOTE — PHYSICAL EXAM
[General Appearance - Well Developed] : well developed [General Appearance - In No Acute Distress] : no acute distress [Normal Conjunctiva] : the conjunctiva exhibited no abnormalities [No Oral Pallor] : no oral pallor [Normal Jugular Venous V Waves Present] : normal jugular venous V waves present [Respiration, Rhythm And Depth] : normal respiratory rhythm and effort [Auscultation Breath Sounds / Voice Sounds] : lungs were clear to auscultation bilaterally [Heart Sounds] : normal S1 and S2 [Murmurs] : no murmurs present [Arterial Pulses Normal] : the arterial pulses were normal [Edema] : no peripheral edema present [Abdomen Tenderness] : non-tender [Nail Clubbing] : no clubbing of the fingernails [Cyanosis, Localized] : no localized cyanosis [] : no rash [Impaired Insight] : insight and judgment were intact

## 2021-02-07 NOTE — REVIEW OF SYSTEMS
[see HPI] : see HPI [Negative] : Eyes [Feeling Fatigued] : not feeling fatigued [Sore Throat] : no sore throat [Cough] : no cough [Abdominal Pain] : no abdominal pain [Skin: A Rash] : no rash: [Dizziness] : no dizziness [Easy Bleeding] : no tendency for easy bleeding [Easy Bruising] : no tendency for easy bruising

## 2021-02-07 NOTE — HISTORY OF PRESENT ILLNESS
[FreeTextEntry1] : Referring Cardiologist: Dr. Yousuf Almonte\par Follow-up evaluation for 70-year-old woman who is seen because of recurrent A. fib.\par \par Previous history:   She was  admitted to [facility] June 2020 after an episode of dizziness and dysarthria.  Her evaluation suggested that she had a CVA.  She was noted to be in atrial fibrillation and was started on Eliquis and rate controlled with diltiazem.  She was discharged home with fortunately no residual deficit.\par The patient returned to the emergency room June 14, 2020 with atypical chest pain and had 2 negative  troponins and was discharged home for follow-up evaluation.  She was noted to be in atrial fibrillation/flutter.\par She underwent EDWIN cardioversion September 2020.  She had recurrent A. fib and was scheduled for another cardioversion but that self converted.  She was subsequently seen at end of December and again was noted to be in A. fib.\par \par The patient was started on amiodarone sometime November 2020.  She is currently taking amiodarone 200 mg once a day.\par \par Current symptoms: Currently she feels fatigued and very tired.  She denies feeling palpitations and is not aware of A. fib.  She gets occasional dizziness.  Patient gets occasional shortness of breath when especially when climbing steps.  She denies chest pain.  \par \par \par Previous testing: She had an echocardiogram performed June 9, 2020 that showed normal ventricular function, left atrial enlargement, septal hypertrophy.  It was reported as a technically difficult study.\par A prior stress echocardiogram performed in 2018 had shown normal systolic function, 7 METS and no evidence of inducible ischemia

## 2021-02-07 NOTE — DISCUSSION/SUMMARY
[FreeTextEntry1] : This is a patient who had presumably embolic stroke in June 2020 from A. fib possibly.\par \par She underwent conversion September 2020 and did well until December when she had recurrent A. fib.  She had self converted and has subsequently redeveloped A. fib and has not been persistent.  Patient has symptoms of fatigue tiredness and shortness of breath with exertion.  She would benefit from restoration of sinus rhythm.\par \par Atrial fibrillation risk factors for this patient: Age, hypertension (blood pressure seems to be fairly well controlled).  She has no prior history of diabetes, thyroid problems.  She had a sleep study which patient reported was mildly abnormal.  No treatment was recommended.\par \par The options for treatment in this patient would either be a catheter ablation procedure versus an antiarrhythmic agent.  She had a stroke approximately 7 months ago.  She had EDWIN that was done in September did not show any thrombus.  She could undergo the catheter ablation procedure.  I have explained to the patient the option would be either the ablation or amiodarone with cardioversion.  The patient clearly states that she would to try another cardioversion first and if it did not work and she would consider the ablation procedure.\par \par Plan: EDWIN/CV with Dr. Almonte.

## 2021-02-17 ENCOUNTER — NON-APPOINTMENT (OUTPATIENT)
Age: 71
End: 2021-02-17

## 2021-02-22 ENCOUNTER — APPOINTMENT (OUTPATIENT)
Dept: DISASTER EMERGENCY | Facility: CLINIC | Age: 71
End: 2021-02-22

## 2021-02-23 LAB — SARS-COV-2 N GENE NPH QL NAA+PROBE: NOT DETECTED

## 2021-02-25 ENCOUNTER — OUTPATIENT (OUTPATIENT)
Dept: OUTPATIENT SERVICES | Facility: HOSPITAL | Age: 71
LOS: 1 days | End: 2021-02-25
Payer: MEDICARE

## 2021-02-25 PROCEDURE — 93010 ELECTROCARDIOGRAM REPORT: CPT | Mod: 76,59

## 2021-02-25 PROCEDURE — 92960 CARDIOVERSION ELECTRIC EXT: CPT

## 2021-02-25 PROCEDURE — 93320 DOPPLER ECHO COMPLETE: CPT | Mod: 26

## 2021-02-25 PROCEDURE — 93312 ECHO TRANSESOPHAGEAL: CPT | Mod: 26

## 2021-03-01 RX ORDER — DILTIAZEM HYDROCHLORIDE 180 MG/1
180 CAPSULE, EXTENDED RELEASE ORAL DAILY
Refills: 0 | Status: DISCONTINUED | COMMUNITY
Start: 2021-01-25 | End: 2021-03-01

## 2021-03-03 ENCOUNTER — NON-APPOINTMENT (OUTPATIENT)
Age: 71
End: 2021-03-03

## 2021-03-04 ENCOUNTER — EMERGENCY (EMERGENCY)
Facility: HOSPITAL | Age: 71
LOS: 1 days | End: 2021-03-04
Admitting: EMERGENCY MEDICINE
Payer: MEDICARE

## 2021-03-04 PROCEDURE — 93010 ELECTROCARDIOGRAM REPORT: CPT

## 2021-03-04 PROCEDURE — 99285 EMERGENCY DEPT VISIT HI MDM: CPT

## 2021-03-04 PROCEDURE — 70450 CT HEAD/BRAIN W/O DYE: CPT | Mod: 26

## 2021-03-08 ENCOUNTER — APPOINTMENT (OUTPATIENT)
Dept: CARDIOLOGY | Facility: CLINIC | Age: 71
End: 2021-03-08
Payer: MEDICARE

## 2021-03-08 ENCOUNTER — APPOINTMENT (OUTPATIENT)
Dept: ELECTROPHYSIOLOGY | Facility: CLINIC | Age: 71
End: 2021-03-08

## 2021-03-08 VITALS
WEIGHT: 132 LBS | HEIGHT: 59 IN | HEART RATE: 85 BPM | SYSTOLIC BLOOD PRESSURE: 144 MMHG | TEMPERATURE: 96.8 F | OXYGEN SATURATION: 98 % | BODY MASS INDEX: 26.61 KG/M2 | DIASTOLIC BLOOD PRESSURE: 80 MMHG

## 2021-03-08 PROCEDURE — 99214 OFFICE O/P EST MOD 30 MIN: CPT

## 2021-03-08 PROCEDURE — 99072 ADDL SUPL MATRL&STAF TM PHE: CPT

## 2021-03-08 RX ORDER — METOPROLOL SUCCINATE 100 MG/1
100 TABLET, EXTENDED RELEASE ORAL DAILY
Qty: 90 | Refills: 0 | Status: DISCONTINUED | COMMUNITY
End: 2021-03-08

## 2021-03-08 NOTE — HISTORY OF PRESENT ILLNESS
[FreeTextEntry1] : Referring Cardiologist: Dr. Yousuf Almonte\par Follow-up evaluation for 70-year-old woman who is seen because of recurrent A. fib.\par \par Previous history:   She was  admitted to [facility] June 2020 after an episode of dizziness and dysarthria.  Her evaluation suggested that she had a CVA.  She was noted to be in atrial fibrillation and was started on Eliquis and rate controlled with diltiazem.  She was discharged home with fortunately no residual deficit.\par The patient returned to the emergency room June 14, 2020 with atypical chest pain and had 2 negative  troponins and was discharged home for follow-up evaluation.  She was noted to be in atrial fibrillation/flutter.\par She underwent EDWIN cardioversion September 2020.  She had recurrent A. fib and was scheduled for another cardioversion but that self converted.  She was subsequently seen at end of December and again was noted to be in A. fib.\par \par The patient was started on amiodarone sometime November 2020.  She is currently taking amiodarone 200 mg once a day.\par \par Current symptoms: Currently she feels fatigued and very tired.  She denies feeling palpitations and is not aware of A. fib.  She gets occasional dizziness.  Patient gets occasional shortness of breath when especially when climbing steps.  She denies chest pain.  \par \par \par Previous testing: She had an echocardiogram performed June 9, 2020 that showed normal ventricular function, left atrial enlargement, septal hypertrophy.  It was reported as a technically difficult study.\par A prior stress echocardiogram performed in 2018 had shown normal systolic function, 7 METS and no evidence of inducible ischemia

## 2021-03-08 NOTE — REVIEW OF SYSTEMS
[Feeling Fatigued] : not feeling fatigued [Sore Throat] : no sore throat [see HPI] : see HPI [Cough] : no cough [Abdominal Pain] : no abdominal pain [Skin: A Rash] : no rash: [Dizziness] : no dizziness [Easy Bleeding] : no tendency for easy bleeding [Easy Bruising] : no tendency for easy bruising [Negative] : Eyes

## 2021-04-05 ENCOUNTER — APPOINTMENT (OUTPATIENT)
Dept: CARDIOLOGY | Facility: CLINIC | Age: 71
End: 2021-04-05
Payer: MEDICARE

## 2021-04-05 VITALS
TEMPERATURE: 97.5 F | BODY MASS INDEX: 26.05 KG/M2 | WEIGHT: 129 LBS | OXYGEN SATURATION: 99 % | DIASTOLIC BLOOD PRESSURE: 74 MMHG | HEART RATE: 75 BPM | SYSTOLIC BLOOD PRESSURE: 132 MMHG

## 2021-04-05 PROCEDURE — 99214 OFFICE O/P EST MOD 30 MIN: CPT

## 2021-04-05 PROCEDURE — 99072 ADDL SUPL MATRL&STAF TM PHE: CPT

## 2021-04-08 ENCOUNTER — APPOINTMENT (OUTPATIENT)
Dept: FAMILY MEDICINE | Facility: CLINIC | Age: 71
End: 2021-04-08
Payer: MEDICARE

## 2021-04-08 VITALS
OXYGEN SATURATION: 99 % | HEIGHT: 59 IN | HEART RATE: 78 BPM | BODY MASS INDEX: 26.28 KG/M2 | RESPIRATION RATE: 16 BRPM | DIASTOLIC BLOOD PRESSURE: 80 MMHG | WEIGHT: 130.38 LBS | SYSTOLIC BLOOD PRESSURE: 154 MMHG | TEMPERATURE: 97.6 F

## 2021-04-08 PROCEDURE — 99214 OFFICE O/P EST MOD 30 MIN: CPT

## 2021-04-08 PROCEDURE — 99072 ADDL SUPL MATRL&STAF TM PHE: CPT

## 2021-04-08 NOTE — HISTORY OF PRESENT ILLNESS
[Spouse] : spouse [FreeTextEntry1] : Follow up after seeing cardiologist \par also still having a dry cough, noticed that it is worse at night. Needs to drink water\par [facility]  [de-identified] : HARVEY states she feels otherwise in good health. \par She was in ER the beginning of this month due to Migraine.

## 2021-04-08 NOTE — PLAN
[FreeTextEntry1] : Rx omeprazole for possible gerd. \prince GABRIEL will f/u with neurology for migraine with hx TIA\prince GABRIEL will RTO for fasting blood work.

## 2021-04-08 NOTE — PHYSICAL EXAM
[Well Nourished] : well nourished [Well Developed] : well developed [Well-Appearing] : well-appearing [Normal Sclera/Conjunctiva] : normal sclera/conjunctiva [PERRL] : pupils equal round and reactive to light [EOMI] : extraocular movements intact [Normal Outer Ear/Nose] : the outer ears and nose were normal in appearance [Normal Oropharynx] : the oropharynx was normal [No JVD] : no jugular venous distention [No Lymphadenopathy] : no lymphadenopathy [Supple] : supple [No Respiratory Distress] : no respiratory distress  [No Accessory Muscle Use] : no accessory muscle use [Clear to Auscultation] : lungs were clear to auscultation bilaterally [Normal S1, S2] : normal S1 and S2 [No Murmur] : no murmur heard [Tachycardia] : tachycardic [Irregularly Irregular] : irregularly irregular [Soft] : abdomen soft [Non Tender] : non-tender [Non-distended] : non-distended [Normal Posterior Cervical Nodes] : no posterior cervical lymphadenopathy [Normal Anterior Cervical Nodes] : no anterior cervical lymphadenopathy [No Joint Swelling] : no joint swelling [Grossly Normal Strength/Tone] : grossly normal strength/tone [No Rash] : no rash [Coordination Grossly Intact] : coordination grossly intact [No Focal Deficits] : no focal deficits [Normal Gait] : normal gait [Deep Tendon Reflexes (DTR)] : deep tendon reflexes were 2+ and symmetric [Normal Affect] : the affect was normal [Normal Insight/Judgement] : insight and judgment were intact

## 2021-04-13 ENCOUNTER — APPOINTMENT (OUTPATIENT)
Dept: FAMILY MEDICINE | Facility: CLINIC | Age: 71
End: 2021-04-13
Payer: MEDICARE

## 2021-04-13 VITALS — TEMPERATURE: 97.3 F

## 2021-04-13 PROCEDURE — 36415 COLL VENOUS BLD VENIPUNCTURE: CPT

## 2021-04-13 PROCEDURE — 99072 ADDL SUPL MATRL&STAF TM PHE: CPT

## 2021-04-17 ENCOUNTER — TRANSCRIPTION ENCOUNTER (OUTPATIENT)
Age: 71
End: 2021-04-17

## 2021-04-17 LAB
ALBUMIN SERPL ELPH-MCNC: 4.8 G/DL
ALP BLD-CCNC: 88 U/L
ALT SERPL-CCNC: 17 U/L
ANION GAP SERPL CALC-SCNC: 13 MMOL/L
AST SERPL-CCNC: 31 U/L
BASOPHILS # BLD AUTO: 0.03 K/UL
BASOPHILS NFR BLD AUTO: 0.5 %
BILIRUB SERPL-MCNC: 0.6 MG/DL
BUN SERPL-MCNC: 14 MG/DL
CALCIUM SERPL-MCNC: 10.2 MG/DL
CHLORIDE SERPL-SCNC: 95 MMOL/L
CHOLEST SERPL-MCNC: 231 MG/DL
CO2 SERPL-SCNC: 29 MMOL/L
CREAT SERPL-MCNC: 1.09 MG/DL
EOSINOPHIL # BLD AUTO: 0.08 K/UL
EOSINOPHIL NFR BLD AUTO: 1.3 %
ESTIMATED AVERAGE GLUCOSE: 117 MG/DL
GLUCOSE SERPL-MCNC: 97 MG/DL
HBA1C MFR BLD HPLC: 5.7 %
HCT VFR BLD CALC: 40 %
HDLC SERPL-MCNC: 89 MG/DL
HGB BLD-MCNC: 13.5 G/DL
IMM GRANULOCYTES NFR BLD AUTO: 0.3 %
LDLC SERPL CALC-MCNC: 122 MG/DL
LYMPHOCYTES # BLD AUTO: 2.2 K/UL
LYMPHOCYTES NFR BLD AUTO: 34.8 %
MAN DIFF?: NORMAL
MCHC RBC-ENTMCNC: 32.1 PG
MCHC RBC-ENTMCNC: 33.8 GM/DL
MCV RBC AUTO: 95 FL
MONOCYTES # BLD AUTO: 0.58 K/UL
MONOCYTES NFR BLD AUTO: 9.2 %
NEUTROPHILS # BLD AUTO: 3.42 K/UL
NEUTROPHILS NFR BLD AUTO: 53.9 %
NONHDLC SERPL-MCNC: 142 MG/DL
PLATELET # BLD AUTO: 295 K/UL
POTASSIUM SERPL-SCNC: 4.4 MMOL/L
PROT SERPL-MCNC: 7.9 G/DL
RBC # BLD: 4.21 M/UL
RBC # FLD: 12.4 %
SODIUM SERPL-SCNC: 137 MMOL/L
TRIGL SERPL-MCNC: 101 MG/DL
TSH SERPL-ACNC: 1.53 UIU/ML
WBC # FLD AUTO: 6.33 K/UL

## 2021-05-01 ENCOUNTER — RX RENEWAL (OUTPATIENT)
Age: 71
End: 2021-05-01

## 2021-05-12 ENCOUNTER — NON-APPOINTMENT (OUTPATIENT)
Age: 71
End: 2021-05-12

## 2021-06-14 ENCOUNTER — APPOINTMENT (OUTPATIENT)
Dept: FAMILY MEDICINE | Facility: CLINIC | Age: 71
End: 2021-06-14
Payer: MEDICARE

## 2021-06-14 VITALS
TEMPERATURE: 97.5 F | WEIGHT: 129 LBS | OXYGEN SATURATION: 97 % | HEIGHT: 59 IN | DIASTOLIC BLOOD PRESSURE: 78 MMHG | RESPIRATION RATE: 16 BRPM | HEART RATE: 79 BPM | BODY MASS INDEX: 26 KG/M2 | SYSTOLIC BLOOD PRESSURE: 134 MMHG

## 2021-06-14 PROCEDURE — 99072 ADDL SUPL MATRL&STAF TM PHE: CPT

## 2021-06-14 PROCEDURE — 99214 OFFICE O/P EST MOD 30 MIN: CPT

## 2021-06-14 RX ORDER — OMEPRAZOLE 20 MG/1
20 TABLET, DELAYED RELEASE ORAL AT BEDTIME
Qty: 30 | Refills: 2 | Status: DISCONTINUED | COMMUNITY
Start: 2021-04-08 | End: 2021-06-14

## 2021-06-14 NOTE — HISTORY OF PRESENT ILLNESS
[FreeTextEntry1] : F/U, not fasting \par no refills needed  [de-identified] : Dry cough - keeps her up at night.

## 2021-07-07 ENCOUNTER — APPOINTMENT (OUTPATIENT)
Dept: MAMMOGRAPHY | Facility: CLINIC | Age: 71
End: 2021-07-07
Payer: MEDICARE

## 2021-07-07 ENCOUNTER — RESULT REVIEW (OUTPATIENT)
Age: 71
End: 2021-07-07

## 2021-07-07 PROCEDURE — 77063 BREAST TOMOSYNTHESIS BI: CPT

## 2021-07-07 PROCEDURE — 77067 SCR MAMMO BI INCL CAD: CPT

## 2021-07-12 ENCOUNTER — APPOINTMENT (OUTPATIENT)
Dept: FAMILY MEDICINE | Facility: CLINIC | Age: 71
End: 2021-07-12
Payer: MEDICARE

## 2021-07-12 VITALS
RESPIRATION RATE: 16 BRPM | BODY MASS INDEX: 26.27 KG/M2 | WEIGHT: 130.31 LBS | SYSTOLIC BLOOD PRESSURE: 152 MMHG | HEIGHT: 59 IN | DIASTOLIC BLOOD PRESSURE: 84 MMHG | OXYGEN SATURATION: 97 % | HEART RATE: 67 BPM | TEMPERATURE: 98 F

## 2021-07-12 PROCEDURE — 99072 ADDL SUPL MATRL&STAF TM PHE: CPT

## 2021-07-12 PROCEDURE — 99214 OFFICE O/P EST MOD 30 MIN: CPT

## 2021-07-13 NOTE — HISTORY OF PRESENT ILLNESS
[FreeTextEntry1] : HARVEY is here to go over results of lab work  [de-identified] : HARVEY states she feels otherwise in good health.\par HARVEY will f/u with Cardio next month.

## 2021-07-15 ENCOUNTER — INPATIENT (INPATIENT)
Facility: HOSPITAL | Age: 71
LOS: 0 days | Discharge: HOME | End: 2021-07-16
Admitting: STUDENT IN AN ORGANIZED HEALTH CARE EDUCATION/TRAINING PROGRAM
Payer: MEDICARE

## 2021-07-15 PROCEDURE — 71045 X-RAY EXAM CHEST 1 VIEW: CPT | Mod: 26

## 2021-07-15 PROCEDURE — 93010 ELECTROCARDIOGRAM REPORT: CPT

## 2021-07-15 PROCEDURE — 99285 EMERGENCY DEPT VISIT HI MDM: CPT

## 2021-07-15 PROCEDURE — 71275 CT ANGIOGRAPHY CHEST: CPT | Mod: 26

## 2021-07-16 RX ORDER — CHLORTHALIDONE 25 MG/1
25 TABLET ORAL DAILY
Qty: 90 | Refills: 1 | Status: DISCONTINUED | COMMUNITY
Start: 2021-03-08 | End: 2021-07-16

## 2021-07-19 ENCOUNTER — NON-APPOINTMENT (OUTPATIENT)
Age: 71
End: 2021-07-19

## 2021-07-20 ENCOUNTER — NON-APPOINTMENT (OUTPATIENT)
Age: 71
End: 2021-07-20

## 2021-07-26 ENCOUNTER — RX RENEWAL (OUTPATIENT)
Age: 71
End: 2021-07-26

## 2021-07-26 ENCOUNTER — APPOINTMENT (OUTPATIENT)
Dept: FAMILY MEDICINE | Facility: CLINIC | Age: 71
End: 2021-07-26
Payer: MEDICARE

## 2021-07-26 VITALS
BODY MASS INDEX: 25.8 KG/M2 | WEIGHT: 128 LBS | TEMPERATURE: 97.3 F | HEART RATE: 73 BPM | SYSTOLIC BLOOD PRESSURE: 148 MMHG | RESPIRATION RATE: 16 BRPM | OXYGEN SATURATION: 98 % | HEIGHT: 59 IN | DIASTOLIC BLOOD PRESSURE: 78 MMHG

## 2021-07-26 PROCEDURE — 99072 ADDL SUPL MATRL&STAF TM PHE: CPT

## 2021-07-26 PROCEDURE — 99214 OFFICE O/P EST MOD 30 MIN: CPT

## 2021-07-26 NOTE — HISTORY OF PRESENT ILLNESS
[Post-hospitalization from ___ Hospital] : Post-hospitalization from [unfilled] Hospital [Admitted on: ___] : The patient was admitted on [unfilled] [Discharged on ___] : discharged on [unfilled] [FreeTextEntry2] : HARVEY was in the hospital for 2 days with S.O.B\par \par Medication will be changed based off of lab results by Cardiologist \par Referral pulmonology

## 2021-07-26 NOTE — PLAN
[FreeTextEntry1] : Ref Pulmonology and Cardiology\par Stop chlorthalidone\par Double up on Spironolactone.

## 2021-07-29 ENCOUNTER — APPOINTMENT (OUTPATIENT)
Dept: MRI IMAGING | Facility: CLINIC | Age: 71
End: 2021-07-29
Payer: MEDICARE

## 2021-07-29 PROCEDURE — 72146 MRI CHEST SPINE W/O DYE: CPT

## 2021-08-03 ENCOUNTER — APPOINTMENT (OUTPATIENT)
Dept: CARDIOLOGY | Facility: CLINIC | Age: 71
End: 2021-08-03
Payer: MEDICARE

## 2021-08-03 VITALS
DIASTOLIC BLOOD PRESSURE: 80 MMHG | BODY MASS INDEX: 25.45 KG/M2 | WEIGHT: 126 LBS | OXYGEN SATURATION: 98 % | HEART RATE: 73 BPM | SYSTOLIC BLOOD PRESSURE: 134 MMHG | TEMPERATURE: 97.6 F

## 2021-08-03 DIAGNOSIS — Z91.09 OTHER ALLERGY STATUS, OTHER THAN TO DRUGS AND BIOLOGICAL SUBSTANCES: ICD-10-CM

## 2021-08-03 DIAGNOSIS — Z01.818 ENCOUNTER FOR OTHER PREPROCEDURAL EXAMINATION: ICD-10-CM

## 2021-08-03 DIAGNOSIS — M89.9 DISORDER OF BONE, UNSPECIFIED: ICD-10-CM

## 2021-08-03 DIAGNOSIS — R51.9 HEADACHE, UNSPECIFIED: ICD-10-CM

## 2021-08-03 PROCEDURE — 99215 OFFICE O/P EST HI 40 MIN: CPT

## 2021-08-09 ENCOUNTER — APPOINTMENT (OUTPATIENT)
Dept: CARDIOLOGY | Facility: CLINIC | Age: 71
End: 2021-08-09

## 2021-08-10 ENCOUNTER — TRANSCRIPTION ENCOUNTER (OUTPATIENT)
Age: 71
End: 2021-08-10

## 2021-08-26 ENCOUNTER — APPOINTMENT (OUTPATIENT)
Dept: FAMILY MEDICINE | Facility: CLINIC | Age: 71
End: 2021-08-26
Payer: MEDICARE

## 2021-08-26 VITALS
BODY MASS INDEX: 25.6 KG/M2 | HEIGHT: 59 IN | DIASTOLIC BLOOD PRESSURE: 84 MMHG | SYSTOLIC BLOOD PRESSURE: 126 MMHG | RESPIRATION RATE: 16 BRPM | TEMPERATURE: 98.3 F | WEIGHT: 127 LBS | HEART RATE: 68 BPM | OXYGEN SATURATION: 98 %

## 2021-08-26 PROCEDURE — 36415 COLL VENOUS BLD VENIPUNCTURE: CPT

## 2021-08-26 PROCEDURE — G0439: CPT

## 2021-08-26 NOTE — HEALTH RISK ASSESSMENT
[Fair] :  ~his/her~ mood as fair [0-4] : 0-4 [No] : No [Never (0 pts)] : Never (0 points) [No falls in past year] : Patient reported no falls in the past year [0] : 2) Feeling down, depressed, or hopeless: Not at all (0) [None] : None [With Significant Other] : lives with significant other [# of Members in Household ___] :  household currently consist of [unfilled] member(s) [Retired] : retired [College] : College [] :  [# Of Children ___] : has [unfilled] children [Feels Safe at Home] : Feels safe at home [Fully functional (bathing, dressing, toileting, transferring, walking, feeding)] : Fully functional (bathing, dressing, toileting, transferring, walking, feeding) [Fully functional (using the telephone, shopping, preparing meals, housekeeping, doing laundry, using] : Fully functional and needs no help or supervision to perform IADLs (using the telephone, shopping, preparing meals, housekeeping, doing laundry, using transportation, managing medications and managing finances) [Smoke Detector] : smoke detector [Carbon Monoxide Detector] : carbon monoxide detector [Safety elements used in home] : safety elements used in home [Seat Belt] :  uses seat belt [Designated Healthcare Proxy] : Designated healthcare proxy [Name: ___] : Health Care Proxy's Name: [unfilled]  [Relationship: ___] : Relationship: [unfilled] [] : No [Change in mental status noted] : No change in mental status noted [Language] : denies difficulty with language [Behavior] : denies difficulty with behavior [Learning/Retaining New Information] : denies difficulty learning/retaining new information [Handling Complex Tasks] : denies difficulty handling complex tasks [Reasoning] : denies difficulty with reasoning [Spatial Ability and Orientation] : denies difficulty with spatial ability and orientation [Sexually Active] : not sexually active [High Risk Behavior] : no high risk behavior [Reports changes in hearing] : Reports no changes in hearing [Reports changes in vision] : Reports no changes in vision [Reports normal functional visual acuity (ie: able to read med bottle)] : Reports poor functional visual acuity.  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

## 2021-08-26 NOTE — PHYSICAL EXAM
[Well Nourished] : well nourished [Well Developed] : well developed [Well-Appearing] : well-appearing [Normal Sclera/Conjunctiva] : normal sclera/conjunctiva [PERRL] : pupils equal round and reactive to light [EOMI] : extraocular movements intact [Normal Outer Ear/Nose] : the outer ears and nose were normal in appearance [Normal Oropharynx] : the oropharynx was normal [No JVD] : no jugular venous distention [Normal TMs] : both tympanic membranes were normal [No Lymphadenopathy] : no lymphadenopathy [Supple] : supple [No Respiratory Distress] : no respiratory distress  [No Accessory Muscle Use] : no accessory muscle use [Normal S1, S2] : normal S1 and S2 [Clear to Auscultation] : lungs were clear to auscultation bilaterally [No Murmur] : no murmur heard [Tachycardia] : tachycardic [Irregularly Irregular] : irregularly irregular [Soft] : abdomen soft [Non-distended] : non-distended [Non Tender] : non-tender [Normal Posterior Cervical Nodes] : no posterior cervical lymphadenopathy [Normal Anterior Cervical Nodes] : no anterior cervical lymphadenopathy [No Joint Swelling] : no joint swelling [Grossly Normal Strength/Tone] : grossly normal strength/tone [No Rash] : no rash [Coordination Grossly Intact] : coordination grossly intact [No Focal Deficits] : no focal deficits [Normal Gait] : normal gait [Deep Tendon Reflexes (DTR)] : deep tendon reflexes were 2+ and symmetric [Normal Affect] : the affect was normal [Normal Insight/Judgement] : insight and judgment were intact

## 2021-08-27 ENCOUNTER — RX RENEWAL (OUTPATIENT)
Age: 71
End: 2021-08-27

## 2021-08-31 LAB
ALBUMIN SERPL ELPH-MCNC: 4.9 G/DL
ALP BLD-CCNC: 86 U/L
ALT SERPL-CCNC: 30 U/L
ANION GAP SERPL CALC-SCNC: 13 MMOL/L
AST SERPL-CCNC: 34 U/L
BASOPHILS # BLD AUTO: 0.03 K/UL
BASOPHILS NFR BLD AUTO: 0.5 %
BILIRUB SERPL-MCNC: 0.4 MG/DL
BUN SERPL-MCNC: 17 MG/DL
CALCIUM SERPL-MCNC: 10.7 MG/DL
CHLORIDE SERPL-SCNC: 98 MMOL/L
CHOLEST SERPL-MCNC: 201 MG/DL
CO2 SERPL-SCNC: 24 MMOL/L
CREAT SERPL-MCNC: 1.24 MG/DL
EOSINOPHIL # BLD AUTO: 0.09 K/UL
EOSINOPHIL NFR BLD AUTO: 1.4 %
ESTIMATED AVERAGE GLUCOSE: 114 MG/DL
GLUCOSE SERPL-MCNC: 106 MG/DL
HBA1C MFR BLD HPLC: 5.6 %
HCT VFR BLD CALC: 42 %
HDLC SERPL-MCNC: 88 MG/DL
HGB BLD-MCNC: 13.5 G/DL
IMM GRANULOCYTES NFR BLD AUTO: 0.2 %
LDLC SERPL CALC-MCNC: 96 MG/DL
LYMPHOCYTES # BLD AUTO: 2.02 K/UL
LYMPHOCYTES NFR BLD AUTO: 30.9 %
MAN DIFF?: NORMAL
MCHC RBC-ENTMCNC: 31.2 PG
MCHC RBC-ENTMCNC: 32.1 GM/DL
MCV RBC AUTO: 97 FL
MONOCYTES # BLD AUTO: 0.52 K/UL
MONOCYTES NFR BLD AUTO: 8 %
NEUTROPHILS # BLD AUTO: 3.87 K/UL
NEUTROPHILS NFR BLD AUTO: 59 %
NONHDLC SERPL-MCNC: 113 MG/DL
PLATELET # BLD AUTO: 235 K/UL
POTASSIUM SERPL-SCNC: 4.9 MMOL/L
PROT SERPL-MCNC: 8.1 G/DL
RBC # BLD: 4.33 M/UL
RBC # FLD: 13 %
SODIUM SERPL-SCNC: 135 MMOL/L
TRIGL SERPL-MCNC: 86 MG/DL
TSH SERPL-ACNC: 1.26 UIU/ML
WBC # FLD AUTO: 6.54 K/UL

## 2021-09-08 ENCOUNTER — RX RENEWAL (OUTPATIENT)
Age: 71
End: 2021-09-08

## 2021-10-08 ENCOUNTER — RX RENEWAL (OUTPATIENT)
Age: 71
End: 2021-10-08

## 2021-10-24 ENCOUNTER — RX RENEWAL (OUTPATIENT)
Age: 71
End: 2021-10-24

## 2021-12-01 ENCOUNTER — RX RENEWAL (OUTPATIENT)
Age: 71
End: 2021-12-01

## 2021-12-08 ENCOUNTER — RX RENEWAL (OUTPATIENT)
Age: 71
End: 2021-12-08

## 2021-12-15 ENCOUNTER — APPOINTMENT (OUTPATIENT)
Dept: RADIOLOGY | Facility: CLINIC | Age: 71
End: 2021-12-15

## 2021-12-20 ENCOUNTER — RX RENEWAL (OUTPATIENT)
Age: 71
End: 2021-12-20

## 2021-12-28 ENCOUNTER — APPOINTMENT (OUTPATIENT)
Dept: FAMILY MEDICINE | Facility: CLINIC | Age: 71
End: 2021-12-28
Payer: MEDICARE

## 2021-12-28 VITALS
OXYGEN SATURATION: 99 % | HEIGHT: 59 IN | RESPIRATION RATE: 16 BRPM | HEART RATE: 58 BPM | WEIGHT: 125 LBS | SYSTOLIC BLOOD PRESSURE: 132 MMHG | TEMPERATURE: 97.9 F | BODY MASS INDEX: 25.2 KG/M2 | DIASTOLIC BLOOD PRESSURE: 74 MMHG

## 2021-12-28 PROCEDURE — 36415 COLL VENOUS BLD VENIPUNCTURE: CPT

## 2021-12-28 PROCEDURE — 99214 OFFICE O/P EST MOD 30 MIN: CPT | Mod: 25

## 2021-12-28 NOTE — PHYSICAL EXAM
[Well Nourished] : well nourished [Well Developed] : well developed [Well-Appearing] : well-appearing [Normal Sclera/Conjunctiva] : normal sclera/conjunctiva [PERRL] : pupils equal round and reactive to light [EOMI] : extraocular movements intact [Normal Outer Ear/Nose] : the outer ears and nose were normal in appearance [Normal Oropharynx] : the oropharynx was normal [Normal TMs] : both tympanic membranes were normal [No JVD] : no jugular venous distention [No Lymphadenopathy] : no lymphadenopathy [Supple] : supple [No Respiratory Distress] : no respiratory distress  [No Accessory Muscle Use] : no accessory muscle use [Clear to Auscultation] : lungs were clear to auscultation bilaterally [Normal S1, S2] : normal S1 and S2 [No Murmur] : no murmur heard [Tachycardia] : tachycardic [Irregularly Irregular] : irregularly irregular [Soft] : abdomen soft [Non Tender] : non-tender [Non-distended] : non-distended [Normal Posterior Cervical Nodes] : no posterior cervical lymphadenopathy [Normal Anterior Cervical Nodes] : no anterior cervical lymphadenopathy [No Joint Swelling] : no joint swelling [Grossly Normal Strength/Tone] : grossly normal strength/tone [No Rash] : no rash [No Focal Deficits] : no focal deficits [Normal Gait] : normal gait [Deep Tendon Reflexes (DTR)] : deep tendon reflexes were 2+ and symmetric [Normal Affect] : the affect was normal [Normal Insight/Judgement] : insight and judgment were intact [Coordination - Dysmetria Impaired Finger-to-Nose Bilateral] : present bilaterally [de-identified] : SL decreased b/l str 4/5 UE b/l no tremor noted.

## 2021-12-28 NOTE — HISTORY OF PRESENT ILLNESS
[FreeTextEntry1] : F/u for medication refill\par  [de-identified] : HARVEY states she feels otherwise in good health.\par

## 2021-12-28 NOTE — PLAN
[FreeTextEntry1] : Blood work obtained.\par Will call with results of blood work.\prince GABRIEL will f/u with neurology.

## 2022-01-07 LAB
ALBUMIN SERPL ELPH-MCNC: 4.9 G/DL
ALP BLD-CCNC: 77 U/L
ALT SERPL-CCNC: 34 U/L
ANION GAP SERPL CALC-SCNC: 15 MMOL/L
AST SERPL-CCNC: 39 U/L
BASOPHILS # BLD AUTO: 0.03 K/UL
BASOPHILS NFR BLD AUTO: 0.4 %
BILIRUB SERPL-MCNC: 0.3 MG/DL
BUN SERPL-MCNC: 26 MG/DL
CALCIUM SERPL-MCNC: 10 MG/DL
CHLORIDE SERPL-SCNC: 100 MMOL/L
CHOLEST SERPL-MCNC: 202 MG/DL
CO2 SERPL-SCNC: 22 MMOL/L
CREAT SERPL-MCNC: 1.39 MG/DL
EOSINOPHIL # BLD AUTO: 0.07 K/UL
EOSINOPHIL NFR BLD AUTO: 0.9 %
ESTIMATED AVERAGE GLUCOSE: 114 MG/DL
GLUCOSE SERPL-MCNC: 114 MG/DL
HBA1C MFR BLD HPLC: 5.6 %
HCT VFR BLD CALC: 38.9 %
HDLC SERPL-MCNC: 77 MG/DL
HGB BLD-MCNC: 12.9 G/DL
IMM GRANULOCYTES NFR BLD AUTO: 0.7 %
LDLC SERPL CALC-MCNC: 107 MG/DL
LYMPHOCYTES # BLD AUTO: 2.13 K/UL
LYMPHOCYTES NFR BLD AUTO: 28.4 %
MAN DIFF?: NORMAL
MCHC RBC-ENTMCNC: 31.3 PG
MCHC RBC-ENTMCNC: 33.2 GM/DL
MCV RBC AUTO: 94.4 FL
MONOCYTES # BLD AUTO: 0.67 K/UL
MONOCYTES NFR BLD AUTO: 8.9 %
NEUTROPHILS # BLD AUTO: 4.54 K/UL
NEUTROPHILS NFR BLD AUTO: 60.7 %
NONHDLC SERPL-MCNC: 124 MG/DL
PLATELET # BLD AUTO: 228 K/UL
POTASSIUM SERPL-SCNC: 4.5 MMOL/L
PROT SERPL-MCNC: 7.7 G/DL
RBC # BLD: 4.12 M/UL
RBC # FLD: 12.4 %
SODIUM SERPL-SCNC: 136 MMOL/L
TRIGL SERPL-MCNC: 89 MG/DL
TSH SERPL-ACNC: 1.12 UIU/ML
WBC # FLD AUTO: 7.49 K/UL

## 2022-01-09 ENCOUNTER — RX RENEWAL (OUTPATIENT)
Age: 72
End: 2022-01-09

## 2022-02-03 ENCOUNTER — APPOINTMENT (OUTPATIENT)
Dept: CARDIOLOGY | Facility: CLINIC | Age: 72
End: 2022-02-03
Payer: MEDICARE

## 2022-02-03 VITALS
DIASTOLIC BLOOD PRESSURE: 70 MMHG | OXYGEN SATURATION: 98 % | SYSTOLIC BLOOD PRESSURE: 140 MMHG | WEIGHT: 120 LBS | RESPIRATION RATE: 14 BRPM | BODY MASS INDEX: 24.24 KG/M2 | TEMPERATURE: 97.8 F | HEART RATE: 55 BPM

## 2022-02-03 PROCEDURE — 99214 OFFICE O/P EST MOD 30 MIN: CPT

## 2022-02-03 PROCEDURE — 93000 ELECTROCARDIOGRAM COMPLETE: CPT

## 2022-02-24 ENCOUNTER — APPOINTMENT (OUTPATIENT)
Dept: FAMILY MEDICINE | Facility: CLINIC | Age: 72
End: 2022-02-24
Payer: MEDICARE

## 2022-02-24 VITALS
DIASTOLIC BLOOD PRESSURE: 80 MMHG | HEIGHT: 59 IN | BODY MASS INDEX: 24.6 KG/M2 | TEMPERATURE: 98 F | RESPIRATION RATE: 16 BRPM | WEIGHT: 122 LBS | HEART RATE: 58 BPM | OXYGEN SATURATION: 98 % | SYSTOLIC BLOOD PRESSURE: 100 MMHG

## 2022-02-24 PROCEDURE — 99214 OFFICE O/P EST MOD 30 MIN: CPT

## 2022-03-07 ENCOUNTER — RX RENEWAL (OUTPATIENT)
Age: 72
End: 2022-03-07

## 2022-03-14 ENCOUNTER — APPOINTMENT (OUTPATIENT)
Dept: CT IMAGING | Facility: CLINIC | Age: 72
End: 2022-03-14
Payer: MEDICARE

## 2022-03-14 PROCEDURE — 72192 CT PELVIS W/O DYE: CPT

## 2022-03-17 NOTE — HISTORY OF PRESENT ILLNESS
[Mild] : mild [___ Weeks ago] : [unfilled] weeks ago [Episodic] : episodic [Constipation] : constipation [Sore Throat] : sore throat [Abdominal Pain] : abdominal pain [Stable] : stable [Nausea] : no nausea [Vomiting] : no vomiting [Diarrhea] : no diarrhea [de-identified] : LLQ [FreeTextEntry8] : HARVEY has a stomach ache for one week. She states it is not severe, but it comes and goes. HARVEY is taking Omeprazole for dry cough. HARVEY states she has pain on left side.\par [facility]

## 2022-03-17 NOTE — PLAN
[FreeTextEntry1] : Rx: omeprazole 40 mg er. \par Rx: Flagyl\par CT pelvis. \par HARVEY will call if sy/sx do not improve.

## 2022-03-17 NOTE — PHYSICAL EXAM
[Well Nourished] : well nourished [Well Developed] : well developed [Well-Appearing] : well-appearing [Normal Sclera/Conjunctiva] : normal sclera/conjunctiva [PERRL] : pupils equal round and reactive to light [EOMI] : extraocular movements intact [Normal Outer Ear/Nose] : the outer ears and nose were normal in appearance [Normal Oropharynx] : the oropharynx was normal [Normal TMs] : both tympanic membranes were normal [No JVD] : no jugular venous distention [No Lymphadenopathy] : no lymphadenopathy [Supple] : supple [No Respiratory Distress] : no respiratory distress  [No Accessory Muscle Use] : no accessory muscle use [Clear to Auscultation] : lungs were clear to auscultation bilaterally [Normal S1, S2] : normal S1 and S2 [No Murmur] : no murmur heard [Tachycardia] : tachycardic [Irregularly Irregular] : irregularly irregular [Soft] : abdomen soft [Non-distended] : non-distended [Normal Posterior Cervical Nodes] : no posterior cervical lymphadenopathy [Normal Anterior Cervical Nodes] : no anterior cervical lymphadenopathy [No Joint Swelling] : no joint swelling [Grossly Normal Strength/Tone] : grossly normal strength/tone [No Rash] : no rash [No Focal Deficits] : no focal deficits [Normal Gait] : normal gait [Deep Tendon Reflexes (DTR)] : deep tendon reflexes were 2+ and symmetric [Coordination - Dysmetria Impaired Finger-to-Nose Bilateral] : present bilaterally [Normal Affect] : the affect was normal [Normal Insight/Judgement] : insight and judgment were intact [LLQ] : in the left lower quadrant [de-identified] : SL decreased b/l str 4/5 UE b/l no tremor noted.

## 2022-03-17 NOTE — ADDENDUM
[FreeTextEntry1] : 3/17\par Reviewed CT. No acute findings. moderate to large amount of stool in rectum. Encourage Stool softener daily. f/u with GI

## 2022-03-29 ENCOUNTER — RX RENEWAL (OUTPATIENT)
Age: 72
End: 2022-03-29

## 2022-04-18 ENCOUNTER — RX RENEWAL (OUTPATIENT)
Age: 72
End: 2022-04-18

## 2022-05-19 ENCOUNTER — NON-APPOINTMENT (OUTPATIENT)
Age: 72
End: 2022-05-19

## 2022-05-20 ENCOUNTER — NON-APPOINTMENT (OUTPATIENT)
Age: 72
End: 2022-05-20

## 2022-05-20 ENCOUNTER — RX RENEWAL (OUTPATIENT)
Age: 72
End: 2022-05-20

## 2022-05-24 ENCOUNTER — APPOINTMENT (OUTPATIENT)
Dept: CARDIOLOGY | Facility: CLINIC | Age: 72
End: 2022-05-24
Payer: MEDICARE

## 2022-05-24 ENCOUNTER — NON-APPOINTMENT (OUTPATIENT)
Age: 72
End: 2022-05-24

## 2022-05-24 VITALS
HEART RATE: 54 BPM | SYSTOLIC BLOOD PRESSURE: 106 MMHG | BODY MASS INDEX: 23.43 KG/M2 | DIASTOLIC BLOOD PRESSURE: 70 MMHG | TEMPERATURE: 97.8 F | WEIGHT: 116 LBS | OXYGEN SATURATION: 99 %

## 2022-05-24 DIAGNOSIS — R73.09 OTHER ABNORMAL GLUCOSE: ICD-10-CM

## 2022-05-24 DIAGNOSIS — I48.19 OTHER PERSISTENT ATRIAL FIBRILLATION: ICD-10-CM

## 2022-05-24 DIAGNOSIS — Z79.899 OTHER LONG TERM (CURRENT) DRUG THERAPY: ICD-10-CM

## 2022-05-24 PROCEDURE — 93000 ELECTROCARDIOGRAM COMPLETE: CPT

## 2022-05-24 PROCEDURE — 99214 OFFICE O/P EST MOD 30 MIN: CPT

## 2022-05-25 ENCOUNTER — APPOINTMENT (OUTPATIENT)
Dept: RADIOLOGY | Facility: CLINIC | Age: 72
End: 2022-05-25
Payer: MEDICARE

## 2022-05-25 PROCEDURE — 71046 X-RAY EXAM CHEST 2 VIEWS: CPT

## 2022-06-02 ENCOUNTER — LABORATORY RESULT (OUTPATIENT)
Age: 72
End: 2022-06-02

## 2022-06-15 ENCOUNTER — RX RENEWAL (OUTPATIENT)
Age: 72
End: 2022-06-15

## 2022-06-20 ENCOUNTER — OUTPATIENT (OUTPATIENT)
Dept: OUTPATIENT SERVICES | Facility: HOSPITAL | Age: 72
LOS: 1 days | End: 2022-06-20

## 2022-06-20 DIAGNOSIS — R06.00 DYSPNEA, UNSPECIFIED: ICD-10-CM

## 2022-06-28 ENCOUNTER — APPOINTMENT (OUTPATIENT)
Dept: FAMILY MEDICINE | Facility: CLINIC | Age: 72
End: 2022-06-28
Payer: MEDICARE

## 2022-06-28 VITALS
BODY MASS INDEX: 23.79 KG/M2 | RESPIRATION RATE: 16 BRPM | OXYGEN SATURATION: 98 % | TEMPERATURE: 98.6 F | WEIGHT: 118 LBS | DIASTOLIC BLOOD PRESSURE: 76 MMHG | SYSTOLIC BLOOD PRESSURE: 112 MMHG | HEIGHT: 59 IN | HEART RATE: 62 BPM

## 2022-06-28 PROCEDURE — 99214 OFFICE O/P EST MOD 30 MIN: CPT | Mod: 25

## 2022-06-28 PROCEDURE — 36415 COLL VENOUS BLD VENIPUNCTURE: CPT

## 2022-06-28 NOTE — PLAN
[FreeTextEntry1] : The following plan was reviewed with the patient:\par 1. check CMP, VIt D and CBCd\par 2. Continue current medical regime\par 3. Refer for bone density, colonoscopy and mammography\par 4. Keep follow up with CARDIO, PULM and OPHTH\par 5. Await results of recent PFTs done by PULM\par 6. Routine follow up in 3-4 months

## 2022-06-28 NOTE — REVIEW OF SYSTEMS
[Fever] : no fever [Chills] : no chills [Recent Change In Weight] : ~T no recent weight change [Discharge] : no discharge [Pain] : no pain [Earache] : no earache [Chest Pain] : no chest pain [Palpitations] : no palpitations [Lower Ext Edema] : no lower extremity edema [Orthopnea] : no orthopnea [Shortness Of Breath] : no shortness of breath [Cough] : no cough

## 2022-06-28 NOTE — PHYSICAL EXAM
[No Acute Distress] : no acute distress [Well Nourished] : well nourished [Well Developed] : well developed [Normal Sclera/Conjunctiva] : normal sclera/conjunctiva [Normal Outer Ear/Nose] : the outer ears and nose were normal in appearance [No JVD] : no jugular venous distention [No Lymphadenopathy] : no lymphadenopathy [Supple] : supple [No Respiratory Distress] : no respiratory distress  [Clear to Auscultation] : lungs were clear to auscultation bilaterally [Normal Rate] : normal rate  [Regular Rhythm] : with a regular rhythm [Normal S1, S2] : normal S1 and S2 [No Murmur] : no murmur heard [No Abdominal Bruit] : a ~M bruit was not heard ~T in the abdomen [No Varicosities] : no varicosities [Pedal Pulses Present] : the pedal pulses are present [No Edema] : there was no peripheral edema [No Palpable Aorta] : no palpable aorta [No Extremity Clubbing/Cyanosis] : no extremity clubbing/cyanosis [Soft] : abdomen soft [Non Tender] : non-tender [Non-distended] : non-distended [No Masses] : no abdominal mass palpated [No HSM] : no HSM [Normal Bowel Sounds] : normal bowel sounds [No Joint Swelling] : no joint swelling [Grossly Normal Strength/Tone] : grossly normal strength/tone [Coordination Grossly Intact] : coordination grossly intact [No Focal Deficits] : no focal deficits [Normal Gait] : normal gait [Normal Affect] : the affect was normal [Normal Insight/Judgement] : insight and judgment were intact

## 2022-06-28 NOTE — HISTORY OF PRESENT ILLNESS
[FreeTextEntry1] : 3 mo f/u \par needs mammo script\par NKDA\par  [de-identified] : Ms. York comes for routine follow up. She recently saw CARDIO (June 22). She had fasting labs and also PFTs done by Dr. Joel. Patient does not need any medication refills at this time.\par \par CARDIO: stable\par PULM: recommended omeprazole OD and that patient sleep with 2 pillows - GERD with intermittent cough - has responded to omeprazole in the past\par Labs: lipids, TFT, LFT lipds unremarkable. \par Mammo due: referral done\par Colonoscopy due (done 2016): referral done\par Bone density: referral done

## 2022-07-02 LAB
25(OH)D3 SERPL-MCNC: 28 NG/ML
ALBUMIN SERPL ELPH-MCNC: 4.9 G/DL
ALP BLD-CCNC: 83 U/L
ALT SERPL-CCNC: 34 U/L
ANION GAP SERPL CALC-SCNC: 12 MMOL/L
AST SERPL-CCNC: 38 U/L
BASOPHILS # BLD AUTO: 0.03 K/UL
BASOPHILS NFR BLD AUTO: 0.5 %
BILIRUB SERPL-MCNC: 0.4 MG/DL
BUN SERPL-MCNC: 17 MG/DL
CALCIUM SERPL-MCNC: 10.3 MG/DL
CHLORIDE SERPL-SCNC: 97 MMOL/L
CO2 SERPL-SCNC: 25 MMOL/L
CREAT SERPL-MCNC: 1.24 MG/DL
EGFR: 46 ML/MIN/1.73M2
EOSINOPHIL # BLD AUTO: 0.09 K/UL
EOSINOPHIL NFR BLD AUTO: 1.5 %
GLUCOSE SERPL-MCNC: 103 MG/DL
HCT VFR BLD CALC: 39.4 %
HGB BLD-MCNC: 13 G/DL
IMM GRANULOCYTES NFR BLD AUTO: 0.3 %
LYMPHOCYTES # BLD AUTO: 1.54 K/UL
LYMPHOCYTES NFR BLD AUTO: 25.9 %
MAN DIFF?: NORMAL
MCHC RBC-ENTMCNC: 31.5 PG
MCHC RBC-ENTMCNC: 33 GM/DL
MCV RBC AUTO: 95.4 FL
MONOCYTES # BLD AUTO: 0.56 K/UL
MONOCYTES NFR BLD AUTO: 9.4 %
NEUTROPHILS # BLD AUTO: 3.71 K/UL
NEUTROPHILS NFR BLD AUTO: 62.4 %
PLATELET # BLD AUTO: 222 K/UL
POTASSIUM SERPL-SCNC: 4.9 MMOL/L
PROT SERPL-MCNC: 7.4 G/DL
RBC # BLD: 4.13 M/UL
RBC # FLD: 12.5 %
SODIUM SERPL-SCNC: 134 MMOL/L
WBC # FLD AUTO: 5.95 K/UL

## 2022-07-07 ENCOUNTER — NON-APPOINTMENT (OUTPATIENT)
Age: 72
End: 2022-07-07

## 2022-07-18 ENCOUNTER — RX RENEWAL (OUTPATIENT)
Age: 72
End: 2022-07-18

## 2022-07-19 ENCOUNTER — RESULT REVIEW (OUTPATIENT)
Age: 72
End: 2022-07-19

## 2022-07-19 ENCOUNTER — APPOINTMENT (OUTPATIENT)
Dept: RADIOLOGY | Facility: CLINIC | Age: 72
End: 2022-07-19

## 2022-07-19 ENCOUNTER — APPOINTMENT (OUTPATIENT)
Dept: MAMMOGRAPHY | Facility: CLINIC | Age: 72
End: 2022-07-19

## 2022-07-19 PROCEDURE — 77067 SCR MAMMO BI INCL CAD: CPT

## 2022-07-19 PROCEDURE — 77080 DXA BONE DENSITY AXIAL: CPT

## 2022-07-19 PROCEDURE — 77063 BREAST TOMOSYNTHESIS BI: CPT

## 2022-07-20 ENCOUNTER — RX RENEWAL (OUTPATIENT)
Age: 72
End: 2022-07-20

## 2022-08-08 ENCOUNTER — LABORATORY RESULT (OUTPATIENT)
Age: 72
End: 2022-08-08

## 2022-08-22 ENCOUNTER — RX RENEWAL (OUTPATIENT)
Age: 72
End: 2022-08-22

## 2022-08-29 ENCOUNTER — APPOINTMENT (OUTPATIENT)
Dept: FAMILY MEDICINE | Facility: CLINIC | Age: 72
End: 2022-08-29

## 2022-08-29 VITALS
HEIGHT: 59 IN | DIASTOLIC BLOOD PRESSURE: 70 MMHG | BODY MASS INDEX: 23.18 KG/M2 | SYSTOLIC BLOOD PRESSURE: 120 MMHG | RESPIRATION RATE: 16 BRPM | TEMPERATURE: 97.7 F | WEIGHT: 115 LBS | OXYGEN SATURATION: 98 % | HEART RATE: 59 BPM

## 2022-08-29 LAB
BILIRUB UR QL STRIP: NEGATIVE
CLARITY UR: CLEAR
COLLECTION METHOD: NORMAL
GLUCOSE UR-MCNC: NEGATIVE
HCG UR QL: 0.2 EU/DL
HGB UR QL STRIP.AUTO: NORMAL
KETONES UR-MCNC: NORMAL
LEUKOCYTE ESTERASE UR QL STRIP: NEGATIVE
NITRITE UR QL STRIP: NEGATIVE
PH UR STRIP: 5.5
PROT UR STRIP-MCNC: 30
SP GR UR STRIP: 1.02

## 2022-08-29 PROCEDURE — 81003 URINALYSIS AUTO W/O SCOPE: CPT | Mod: QW

## 2022-08-29 PROCEDURE — 99397 PER PM REEVAL EST PAT 65+ YR: CPT | Mod: 25

## 2022-08-29 RX ORDER — OMEPRAZOLE 20 MG/1
20 CAPSULE, DELAYED RELEASE ORAL
Qty: 90 | Refills: 0 | Status: DISCONTINUED | COMMUNITY
Start: 2022-05-25

## 2022-08-29 RX ORDER — TIMOLOL MALEATE 5 MG/ML
0.5 SOLUTION OPHTHALMIC
Qty: 5 | Refills: 0 | Status: ACTIVE | COMMUNITY
Start: 2022-05-03

## 2022-08-29 NOTE — HISTORY OF PRESENT ILLNESS
[FreeTextEntry1] : CPE \par does not need any medications refilled \par discuss ongoing cough that is bad at night \par NKDA \par [facility]  [de-identified] : HARVEY states she has a cough at night. Raised head of bed and taking Omeprazole does not help. 10-12 months. Saw Dr. Joel. Pulmonary rx: Omeprazole. Tickle in back of throat. Has not seen ENT. \par HARVEY states she feels otherwise in good health.\par

## 2022-08-29 NOTE — PHYSICAL EXAM
[Well Nourished] : well nourished [Well Developed] : well developed [Well-Appearing] : well-appearing [Normal Sclera/Conjunctiva] : normal sclera/conjunctiva [PERRL] : pupils equal round and reactive to light [EOMI] : extraocular movements intact [Normal Outer Ear/Nose] : the outer ears and nose were normal in appearance [Normal Oropharynx] : the oropharynx was normal [Normal TMs] : both tympanic membranes were normal [No JVD] : no jugular venous distention [No Lymphadenopathy] : no lymphadenopathy [Supple] : supple [No Respiratory Distress] : no respiratory distress  [No Accessory Muscle Use] : no accessory muscle use [Clear to Auscultation] : lungs were clear to auscultation bilaterally [Normal S1, S2] : normal S1 and S2 [No Murmur] : no murmur heard [Tachycardia] : tachycardic [Irregularly Irregular] : irregularly irregular [Soft] : abdomen soft [Non-distended] : non-distended [LLQ] : in the left lower quadrant [Normal Posterior Cervical Nodes] : no posterior cervical lymphadenopathy [Normal Anterior Cervical Nodes] : no anterior cervical lymphadenopathy [No Joint Swelling] : no joint swelling [Grossly Normal Strength/Tone] : grossly normal strength/tone [No Rash] : no rash [No Focal Deficits] : no focal deficits [Normal Gait] : normal gait [Deep Tendon Reflexes (DTR)] : deep tendon reflexes were 2+ and symmetric [Coordination - Dysmetria Impaired Finger-to-Nose Bilateral] : present bilaterally [Normal Affect] : the affect was normal [Normal Insight/Judgement] : insight and judgment were intact [de-identified] : SL decreased b/l str 4/5 UE b/l no tremor noted.

## 2022-09-19 ENCOUNTER — RX RENEWAL (OUTPATIENT)
Age: 72
End: 2022-09-19

## 2022-09-20 ENCOUNTER — RX RENEWAL (OUTPATIENT)
Age: 72
End: 2022-09-20

## 2022-10-20 ENCOUNTER — RX RENEWAL (OUTPATIENT)
Age: 72
End: 2022-10-20

## 2022-10-24 ENCOUNTER — RX RENEWAL (OUTPATIENT)
Age: 72
End: 2022-10-24

## 2022-11-05 ENCOUNTER — NON-APPOINTMENT (OUTPATIENT)
Age: 72
End: 2022-11-05

## 2022-11-08 ENCOUNTER — OFFICE (OUTPATIENT)
Dept: URBAN - METROPOLITAN AREA CLINIC 38 | Facility: CLINIC | Age: 72
Setting detail: OPHTHALMOLOGY
End: 2022-11-08
Payer: MEDICARE

## 2022-11-08 DIAGNOSIS — H40.033: ICD-10-CM

## 2022-11-08 DIAGNOSIS — H40.053: ICD-10-CM

## 2022-11-08 DIAGNOSIS — H35.3131: ICD-10-CM

## 2022-11-08 DIAGNOSIS — Z79.899: ICD-10-CM

## 2022-11-08 DIAGNOSIS — H25.13: ICD-10-CM

## 2022-11-08 DIAGNOSIS — I78.0: ICD-10-CM

## 2022-11-08 DIAGNOSIS — H35.033: ICD-10-CM

## 2022-11-08 PROCEDURE — 92014 COMPRE OPH EXAM EST PT 1/>: CPT | Performed by: OPHTHALMOLOGY

## 2022-11-08 PROCEDURE — 92133 CPTRZD OPH DX IMG PST SGM ON: CPT | Performed by: OPHTHALMOLOGY

## 2022-11-08 PROCEDURE — 92020 GONIOSCOPY: CPT | Performed by: OPHTHALMOLOGY

## 2022-11-08 ASSESSMENT — AXIALLENGTH_DERIVED
OS_AL: 22.5467
OS_AL: 22.5467
OS_AL: 22.5912
OD_AL: 22.6809
OD_AL: 22.8624
OD_AL: 22.6809

## 2022-11-08 ASSESSMENT — REFRACTION_MANIFEST
OD_AXIS: 060
OD_CYLINDER: -0.75
OD_VA2: 20/25(J1)
OS_CYLINDER: -0.50
OS_ADD: +3.00
OD_VA2: 20/25(J1)
OD_ADD: +3.00
OS_CYLINDER: -0.50
OU_VA: 20/25-2
OU_VA: 20/25-2
OS_AXIS: 180
OS_AXIS: 180
OD_VA1: 20/30-2
OD_ADD: +3.00
OD_SPHERE: +3.75
OS_VA1: 20/25-
OS_VA1: 20/25-
OS_SPHERE: +4.00
OS_ADD: +3.00
OD_CYLINDER: -0.75
OS_VA2: 20/25(J1)
OS_VA2: 20/25(J1)
OD_AXIS: 060
OS_SPHERE: +4.00
OD_VA1: 20/30-2
OD_SPHERE: +3.75

## 2022-11-08 ASSESSMENT — SPHEQUIV_DERIVED
OS_SPHEQUIV: 3.625
OS_SPHEQUIV: 3.75
OD_SPHEQUIV: 3.375
OD_SPHEQUIV: 2.875
OD_SPHEQUIV: 3.375
OS_SPHEQUIV: 3.75

## 2022-11-08 ASSESSMENT — REFRACTION_CURRENTRX
OS_CYLINDER: -0.50
OS_SPHERE: +4.00
OS_VPRISM_DIRECTION: PROGS
OD_ADD: +3.00
OS_OVR_VA: 20/
OD_SPHERE: +3.50
OD_AXIS: 087
OD_VPRISM_DIRECTION: PROGS
OS_AXIS: 064
OS_ADD: +3.00
OD_OVR_VA: 20/
OD_CYLINDER: -1.00

## 2022-11-08 ASSESSMENT — CONFRONTATIONAL VISUAL FIELD TEST (CVF)
OS_FINDINGS: FULL
OD_FINDINGS: FULL

## 2022-11-08 ASSESSMENT — KERATOMETRY
OD_AXISANGLE_DEGREES: 087
METHOD_AUTO_MANUAL: AUTO
OD_K2POWER_DIOPTERS: 42.75
OD_K1POWER_DIOPTERS: 42.25
OS_K1POWER_DIOPTERS: 42.25
OS_AXISANGLE_DEGREES: 093
OS_K2POWER_DIOPTERS: 42.75

## 2022-11-08 ASSESSMENT — REFRACTION_AUTOREFRACTION
OD_AXIS: 067
OD_SPHERE: +3.00
OS_AXIS: 091
OS_SPHERE: +3.75
OD_CYLINDER: -0.25
OS_CYLINDER: -0.25

## 2022-11-08 ASSESSMENT — VISUAL ACUITY
OS_BCVA: 20/40+2
OD_BCVA: 20/30

## 2022-11-08 ASSESSMENT — TONOMETRY
OS_IOP_MMHG: 17
OD_IOP_MMHG: 17

## 2022-11-09 ENCOUNTER — APPOINTMENT (OUTPATIENT)
Dept: CARDIOLOGY | Facility: CLINIC | Age: 72
End: 2022-11-09

## 2022-11-09 VITALS
OXYGEN SATURATION: 98 % | WEIGHT: 114 LBS | SYSTOLIC BLOOD PRESSURE: 116 MMHG | BODY MASS INDEX: 23.03 KG/M2 | HEART RATE: 54 BPM | TEMPERATURE: 97.8 F | DIASTOLIC BLOOD PRESSURE: 74 MMHG

## 2022-11-09 DIAGNOSIS — H35.3131 NONEXUDATIVE AGE-RELATED MACULAR DEGENERATION, BILATERAL, EARLY DRY STAGE: ICD-10-CM

## 2022-11-09 DIAGNOSIS — Z87.19 PERSONAL HISTORY OF OTHER DISEASES OF THE DIGESTIVE SYSTEM: ICD-10-CM

## 2022-11-09 DIAGNOSIS — Z12.39 ENCOUNTER FOR OTHER SCREENING FOR MALIGNANT NEOPLASM OF BREAST: ICD-10-CM

## 2022-11-09 DIAGNOSIS — Z13.820 ENCOUNTER FOR SCREENING FOR OSTEOPOROSIS: ICD-10-CM

## 2022-11-09 DIAGNOSIS — Z12.11 ENCOUNTER FOR SCREENING FOR MALIGNANT NEOPLASM OF COLON: ICD-10-CM

## 2022-11-09 PROCEDURE — 99215 OFFICE O/P EST HI 40 MIN: CPT

## 2022-11-21 ENCOUNTER — RX RENEWAL (OUTPATIENT)
Age: 72
End: 2022-11-21

## 2022-11-28 ENCOUNTER — APPOINTMENT (OUTPATIENT)
Dept: FAMILY MEDICINE | Facility: CLINIC | Age: 72
End: 2022-11-28

## 2022-12-18 ENCOUNTER — RX RENEWAL (OUTPATIENT)
Age: 72
End: 2022-12-18

## 2022-12-27 ENCOUNTER — RX RENEWAL (OUTPATIENT)
Age: 72
End: 2022-12-27

## 2023-01-04 ENCOUNTER — APPOINTMENT (OUTPATIENT)
Dept: RADIOLOGY | Facility: CLINIC | Age: 73
End: 2023-01-04
Payer: MEDICARE

## 2023-01-04 PROCEDURE — 71046 X-RAY EXAM CHEST 2 VIEWS: CPT

## 2023-01-13 ENCOUNTER — APPOINTMENT (OUTPATIENT)
Dept: FAMILY MEDICINE | Facility: CLINIC | Age: 73
End: 2023-01-13
Payer: MEDICARE

## 2023-01-13 VITALS
RESPIRATION RATE: 15 BRPM | TEMPERATURE: 97.9 F | HEART RATE: 49 BPM | SYSTOLIC BLOOD PRESSURE: 146 MMHG | OXYGEN SATURATION: 98 % | WEIGHT: 115 LBS | HEIGHT: 59 IN | BODY MASS INDEX: 23.18 KG/M2 | DIASTOLIC BLOOD PRESSURE: 82 MMHG

## 2023-01-13 PROCEDURE — 99214 OFFICE O/P EST MOD 30 MIN: CPT

## 2023-01-13 RX ORDER — AMIODARONE HYDROCHLORIDE 200 MG/1
200 TABLET ORAL
Qty: 90 | Refills: 1 | Status: DISCONTINUED | COMMUNITY
Start: 2020-11-24 | End: 2022-10-13

## 2023-01-13 NOTE — HISTORY OF PRESENT ILLNESS
[FreeTextEntry1] : Follow up \par NKDA\par [facility] [de-identified] : HARVEY states she feels otherwise in good health. \par Couh at night. She is taking Omeprazole 40 mg qhs. \par Dr. Mccloud. \par CXR negative. \par Has slightly improved.

## 2023-01-13 NOTE — PHYSICAL EXAM
[No Acute Distress] : no acute distress [Well Nourished] : well nourished [Well Developed] : well developed [Well-Appearing] : well-appearing [Normal Sclera/Conjunctiva] : normal sclera/conjunctiva [PERRL] : pupils equal round and reactive to light [EOMI] : extraocular movements intact [Normal Outer Ear/Nose] : the outer ears and nose were normal in appearance [Normal Oropharynx] : the oropharynx was normal [Normal TMs] : both tympanic membranes were normal [No JVD] : no jugular venous distention [No Lymphadenopathy] : no lymphadenopathy [Supple] : supple [No Respiratory Distress] : no respiratory distress  [No Accessory Muscle Use] : no accessory muscle use [Clear to Auscultation] : lungs were clear to auscultation bilaterally [Normal S1, S2] : normal S1 and S2 [No Murmur] : no murmur heard [Tachycardia] : tachycardic [Irregularly Irregular] : irregularly irregular [Soft] : abdomen soft [Non-distended] : non-distended [LLQ] : in the left lower quadrant [Normal Posterior Cervical Nodes] : no posterior cervical lymphadenopathy [Normal Anterior Cervical Nodes] : no anterior cervical lymphadenopathy [No Joint Swelling] : no joint swelling [Grossly Normal Strength/Tone] : grossly normal strength/tone [No Rash] : no rash [No Focal Deficits] : no focal deficits [Normal Gait] : normal gait [Deep Tendon Reflexes (DTR)] : deep tendon reflexes were 2+ and symmetric [Coordination - Dysmetria Impaired Finger-to-Nose Bilateral] : present bilaterally [Normal Affect] : the affect was normal [Alert and Oriented x3] : oriented to person, place, and time [Normal Mood] : the mood was normal [Normal Insight/Judgement] : insight and judgment were intact [de-identified] : SL decreased b/l str 4/5 UE b/l no tremor noted.

## 2023-01-13 NOTE — HEALTH RISK ASSESSMENT
[Never] : Never [No] : In the past 12 months have you used drugs other than those required for medical reasons? No [No falls in past year] : Patient reported no falls in the past year [0] : 2) Feeling down, depressed, or hopeless: Not at all (0) [PHQ-2 Negative - No further assessment needed] : PHQ-2 Negative - No further assessment needed [[id number]] : 0

## 2023-01-16 LAB
ALBUMIN SERPL ELPH-MCNC: 4.7 G/DL
ALP BLD-CCNC: 83 U/L
ALT SERPL-CCNC: 22 U/L
ANION GAP SERPL CALC-SCNC: 11 MMOL/L
AST SERPL-CCNC: 25 U/L
BASOPHILS # BLD AUTO: 0.03 K/UL
BASOPHILS NFR BLD AUTO: 0.5 %
BILIRUB SERPL-MCNC: 0.4 MG/DL
BUN SERPL-MCNC: 15 MG/DL
CALCIUM SERPL-MCNC: 10 MG/DL
CHLORIDE SERPL-SCNC: 99 MMOL/L
CHOLEST SERPL-MCNC: 193 MG/DL
CO2 SERPL-SCNC: 26 MMOL/L
CREAT SERPL-MCNC: 1.06 MG/DL
EGFR: 55 ML/MIN/1.73M2
EOSINOPHIL # BLD AUTO: 0.1 K/UL
EOSINOPHIL NFR BLD AUTO: 1.6 %
ESTIMATED AVERAGE GLUCOSE: 117 MG/DL
GLUCOSE SERPL-MCNC: 91 MG/DL
HBA1C MFR BLD HPLC: 5.7 %
HCT VFR BLD CALC: 37.4 %
HDLC SERPL-MCNC: 75 MG/DL
HGB BLD-MCNC: 12.4 G/DL
IMM GRANULOCYTES NFR BLD AUTO: 0.3 %
LDLC SERPL CALC-MCNC: 97 MG/DL
LYMPHOCYTES # BLD AUTO: 1.78 K/UL
LYMPHOCYTES NFR BLD AUTO: 28.7 %
MAN DIFF?: NORMAL
MCHC RBC-ENTMCNC: 31.6 PG
MCHC RBC-ENTMCNC: 33.2 GM/DL
MCV RBC AUTO: 95.4 FL
MONOCYTES # BLD AUTO: 0.53 K/UL
MONOCYTES NFR BLD AUTO: 8.5 %
NEUTROPHILS # BLD AUTO: 3.75 K/UL
NEUTROPHILS NFR BLD AUTO: 60.4 %
NONHDLC SERPL-MCNC: 118 MG/DL
PLATELET # BLD AUTO: 225 K/UL
POTASSIUM SERPL-SCNC: 5 MMOL/L
PROT SERPL-MCNC: 7.8 G/DL
RBC # BLD: 3.92 M/UL
RBC # FLD: 12.8 %
SODIUM SERPL-SCNC: 136 MMOL/L
TRIGL SERPL-MCNC: 105 MG/DL
TSH SERPL-ACNC: 1.25 UIU/ML
WBC # FLD AUTO: 6.21 K/UL

## 2023-01-23 ENCOUNTER — NON-APPOINTMENT (OUTPATIENT)
Age: 73
End: 2023-01-23

## 2023-01-23 RX ORDER — APIXABAN 5 MG/1
5 TABLET, FILM COATED ORAL
Qty: 180 | Refills: 3 | Status: DISCONTINUED | COMMUNITY
Start: 2020-06-15 | End: 2023-01-23

## 2023-02-13 ENCOUNTER — RESULT CHARGE (OUTPATIENT)
Age: 73
End: 2023-02-13

## 2023-02-15 ENCOUNTER — NON-APPOINTMENT (OUTPATIENT)
Age: 73
End: 2023-02-15

## 2023-02-15 LAB
ANION GAP SERPL CALC-SCNC: 13 MMOL/L
BASOPHILS # BLD AUTO: 0.04 K/UL
BASOPHILS NFR BLD AUTO: 0.6 %
BUN SERPL-MCNC: 19 MG/DL
CALCIUM SERPL-MCNC: 10.3 MG/DL
CHLORIDE SERPL-SCNC: 97 MMOL/L
CO2 SERPL-SCNC: 26 MMOL/L
CREAT SERPL-MCNC: 1.09 MG/DL
EGFR: 53 ML/MIN/1.73M2
EOSINOPHIL # BLD AUTO: 0.26 K/UL
EOSINOPHIL NFR BLD AUTO: 3.9 %
GLUCOSE SERPL-MCNC: 104 MG/DL
HCT VFR BLD CALC: 41.7 %
HGB BLD-MCNC: 14 G/DL
IMM GRANULOCYTES NFR BLD AUTO: 0.3 %
LYMPHOCYTES # BLD AUTO: 2.22 K/UL
LYMPHOCYTES NFR BLD AUTO: 33.4 %
MAN DIFF?: NORMAL
MCHC RBC-ENTMCNC: 32.3 PG
MCHC RBC-ENTMCNC: 33.6 GM/DL
MCV RBC AUTO: 96.3 FL
MONOCYTES # BLD AUTO: 0.53 K/UL
MONOCYTES NFR BLD AUTO: 8 %
NEUTROPHILS # BLD AUTO: 3.58 K/UL
NEUTROPHILS NFR BLD AUTO: 53.8 %
PLATELET # BLD AUTO: 234 K/UL
POTASSIUM SERPL-SCNC: 5.5 MMOL/L
RBC # BLD: 4.33 M/UL
RBC # FLD: 12.7 %
SODIUM SERPL-SCNC: 136 MMOL/L
WBC # FLD AUTO: 6.65 K/UL

## 2023-02-16 ENCOUNTER — LABORATORY RESULT (OUTPATIENT)
Age: 73
End: 2023-02-16

## 2023-02-17 ENCOUNTER — RX RENEWAL (OUTPATIENT)
Age: 73
End: 2023-02-17

## 2023-03-15 ENCOUNTER — NON-APPOINTMENT (OUTPATIENT)
Age: 73
End: 2023-03-15

## 2023-03-16 RX ORDER — ATORVASTATIN CALCIUM 80 MG/1
80 TABLET, FILM COATED ORAL
Qty: 90 | Refills: 0 | Status: ACTIVE | COMMUNITY
Start: 2023-03-16 | End: 1900-01-01

## 2023-04-17 ENCOUNTER — NON-APPOINTMENT (OUTPATIENT)
Age: 73
End: 2023-04-17

## 2023-04-18 ENCOUNTER — RX RENEWAL (OUTPATIENT)
Age: 73
End: 2023-04-18

## 2023-05-09 ENCOUNTER — OFFICE (OUTPATIENT)
Dept: URBAN - METROPOLITAN AREA CLINIC 38 | Facility: CLINIC | Age: 73
Setting detail: OPHTHALMOLOGY
End: 2023-05-09
Payer: MEDICARE

## 2023-05-09 DIAGNOSIS — H35.3131: ICD-10-CM

## 2023-05-09 DIAGNOSIS — H40.053: ICD-10-CM

## 2023-05-09 DIAGNOSIS — I78.0: ICD-10-CM

## 2023-05-09 DIAGNOSIS — H35.033: ICD-10-CM

## 2023-05-09 DIAGNOSIS — H25.13: ICD-10-CM

## 2023-05-09 DIAGNOSIS — Z79.899: ICD-10-CM

## 2023-05-09 DIAGNOSIS — H40.033: ICD-10-CM

## 2023-05-09 PROCEDURE — 92083 EXTENDED VISUAL FIELD XM: CPT | Performed by: OPHTHALMOLOGY

## 2023-05-09 PROCEDURE — 99213 OFFICE O/P EST LOW 20 MIN: CPT | Performed by: OPHTHALMOLOGY

## 2023-05-09 PROCEDURE — 92134 CPTRZ OPH DX IMG PST SGM RTA: CPT | Performed by: OPHTHALMOLOGY

## 2023-05-09 ASSESSMENT — REFRACTION_AUTOREFRACTION
OD_AXIS: 044
OD_CYLINDER: -0.25
OS_SPHERE: +3.25
OS_CYLINDER: SPH
OD_SPHERE: +3.00

## 2023-05-09 ASSESSMENT — AXIALLENGTH_DERIVED
OS_AL: 22.5467
OD_AL: 22.6809
OD_AL: 22.8624
OS_AL: 22.5467
OD_AL: 22.6809

## 2023-05-09 ASSESSMENT — SPHEQUIV_DERIVED
OD_SPHEQUIV: 2.875
OS_SPHEQUIV: 3.75
OD_SPHEQUIV: 3.375
OD_SPHEQUIV: 3.375
OS_SPHEQUIV: 3.75

## 2023-05-09 ASSESSMENT — REFRACTION_MANIFEST
OD_SPHERE: +3.75
OS_CYLINDER: -0.50
OD_CYLINDER: -0.75
OD_SPHERE: +3.75
OD_CYLINDER: -0.75
OD_ADD: +3.00
OD_VA1: 20/30-2
OS_CYLINDER: -0.50
OS_ADD: +3.00
OS_AXIS: 180
OS_SPHERE: +4.00
OD_VA2: 20/25(J1)
OU_VA: 20/25-2
OS_VA2: 20/25(J1)
OD_ADD: +3.00
OD_AXIS: 060
OD_VA2: 20/25(J1)
OS_SPHERE: +4.00
OS_ADD: +3.00
OD_VA1: 20/30-2
OD_AXIS: 060
OU_VA: 20/25-2
OS_VA1: 20/25-
OS_AXIS: 180
OS_VA2: 20/25(J1)
OS_VA1: 20/25-

## 2023-05-09 ASSESSMENT — KERATOMETRY
METHOD_AUTO_MANUAL: AUTO
OS_K2POWER_DIOPTERS: 42.75
OS_K1POWER_DIOPTERS: 42.25
OS_AXISANGLE_DEGREES: 091
OD_K2POWER_DIOPTERS: 43.00
OD_AXISANGLE_DEGREES: 086
OD_K1POWER_DIOPTERS: 42.00

## 2023-05-09 ASSESSMENT — REFRACTION_CURRENTRX
OD_ADD: +3.00
OS_CYLINDER: -0.50
OD_VPRISM_DIRECTION: PROGS
OS_ADD: +3.00
OS_VPRISM_DIRECTION: PROGS
OD_CYLINDER: -1.00
OD_OVR_VA: 20/
OS_OVR_VA: 20/
OS_SPHERE: +4.00
OD_AXIS: 087
OS_AXIS: 064
OD_SPHERE: +3.50

## 2023-05-09 ASSESSMENT — CONFRONTATIONAL VISUAL FIELD TEST (CVF)
OS_FINDINGS: FULL
OD_FINDINGS: FULL

## 2023-05-09 ASSESSMENT — TONOMETRY
OS_IOP_MMHG: 20
OD_IOP_MMHG: 20

## 2023-05-09 ASSESSMENT — VISUAL ACUITY
OD_BCVA: 20/30
OS_BCVA: 20/40+2

## 2023-05-10 ENCOUNTER — APPOINTMENT (OUTPATIENT)
Dept: CARDIOLOGY | Facility: CLINIC | Age: 73
End: 2023-05-10
Payer: MEDICARE

## 2023-05-10 VITALS
WEIGHT: 114 LBS | SYSTOLIC BLOOD PRESSURE: 144 MMHG | HEART RATE: 52 BPM | DIASTOLIC BLOOD PRESSURE: 72 MMHG | OXYGEN SATURATION: 97 % | BODY MASS INDEX: 23.03 KG/M2

## 2023-05-10 DIAGNOSIS — Z86.39 PERSONAL HISTORY OF OTHER ENDOCRINE, NUTRITIONAL AND METABOLIC DISEASE: ICD-10-CM

## 2023-05-10 DIAGNOSIS — Z23 ENCOUNTER FOR IMMUNIZATION: ICD-10-CM

## 2023-05-10 PROCEDURE — 99214 OFFICE O/P EST MOD 30 MIN: CPT

## 2023-05-15 ENCOUNTER — APPOINTMENT (OUTPATIENT)
Dept: CT IMAGING | Facility: CLINIC | Age: 73
End: 2023-05-15
Payer: MEDICARE

## 2023-05-15 ENCOUNTER — NON-APPOINTMENT (OUTPATIENT)
Age: 73
End: 2023-05-15

## 2023-05-15 PROCEDURE — 71250 CT THORAX DX C-: CPT

## 2023-06-21 ENCOUNTER — OFFICE (OUTPATIENT)
Dept: URBAN - METROPOLITAN AREA CLINIC 38 | Facility: CLINIC | Age: 73
Setting detail: OPHTHALMOLOGY
End: 2023-06-21
Payer: MEDICARE

## 2023-06-21 DIAGNOSIS — I78.0: ICD-10-CM

## 2023-06-21 DIAGNOSIS — H35.3131: ICD-10-CM

## 2023-06-21 PROBLEM — H25.13 CATARACT SENILE NUCLEAR SCLEROSIS; BOTH EYES: Status: ACTIVE | Noted: 2023-06-21

## 2023-06-21 PROCEDURE — 92235 FLUORESCEIN ANGRPH MLTIFRAME: CPT | Performed by: SPECIALIST

## 2023-06-21 PROCEDURE — 92134 CPTRZ OPH DX IMG PST SGM RTA: CPT | Performed by: SPECIALIST

## 2023-06-21 PROCEDURE — 99213 OFFICE O/P EST LOW 20 MIN: CPT | Performed by: SPECIALIST

## 2023-06-21 ASSESSMENT — VISUAL ACUITY
OS_BCVA: 20/50+2
OD_BCVA: 20/25+2

## 2023-06-21 ASSESSMENT — REFRACTION_AUTOREFRACTION
OD_CYLINDER: -0.25
OS_CYLINDER: SPH
OD_SPHERE: +3.00
OS_SPHERE: +3.25
OD_AXIS: 044

## 2023-06-21 ASSESSMENT — KERATOMETRY
OS_K2POWER_DIOPTERS: 42.75
OS_K1POWER_DIOPTERS: 42.25
METHOD_AUTO_MANUAL: AUTO
OD_AXISANGLE_DEGREES: 086
OD_K2POWER_DIOPTERS: 43.00
OS_AXISANGLE_DEGREES: 091
OD_K1POWER_DIOPTERS: 42.00

## 2023-06-21 ASSESSMENT — SPHEQUIV_DERIVED: OD_SPHEQUIV: 2.875

## 2023-06-21 ASSESSMENT — AXIALLENGTH_DERIVED: OD_AL: 22.8624

## 2023-06-21 ASSESSMENT — CONFRONTATIONAL VISUAL FIELD TEST (CVF)
OS_FINDINGS: FULL
OD_FINDINGS: FULL

## 2023-06-29 ENCOUNTER — NON-APPOINTMENT (OUTPATIENT)
Age: 73
End: 2023-06-29

## 2023-07-11 ENCOUNTER — APPOINTMENT (OUTPATIENT)
Dept: CARDIOLOGY | Facility: CLINIC | Age: 73
End: 2023-07-11
Payer: MEDICARE

## 2023-07-11 ENCOUNTER — NON-APPOINTMENT (OUTPATIENT)
Age: 73
End: 2023-07-11

## 2023-07-11 VITALS
OXYGEN SATURATION: 97 % | HEART RATE: 103 BPM | DIASTOLIC BLOOD PRESSURE: 82 MMHG | WEIGHT: 114 LBS | BODY MASS INDEX: 23.03 KG/M2 | SYSTOLIC BLOOD PRESSURE: 120 MMHG

## 2023-07-11 PROCEDURE — 99215 OFFICE O/P EST HI 40 MIN: CPT | Mod: 25

## 2023-07-11 PROCEDURE — 93000 ELECTROCARDIOGRAM COMPLETE: CPT

## 2023-07-16 ENCOUNTER — RX RENEWAL (OUTPATIENT)
Age: 73
End: 2023-07-16

## 2023-07-19 ENCOUNTER — NON-APPOINTMENT (OUTPATIENT)
Age: 73
End: 2023-07-19

## 2023-07-19 ENCOUNTER — OFFICE (OUTPATIENT)
Dept: URBAN - METROPOLITAN AREA CLINIC 38 | Facility: CLINIC | Age: 73
Setting detail: OPHTHALMOLOGY
End: 2023-07-19
Payer: MEDICARE

## 2023-07-19 ENCOUNTER — APPOINTMENT (OUTPATIENT)
Dept: CARDIOLOGY | Facility: CLINIC | Age: 73
End: 2023-07-19
Payer: MEDICARE

## 2023-07-19 VITALS — HEART RATE: 71 BPM | DIASTOLIC BLOOD PRESSURE: 64 MMHG | OXYGEN SATURATION: 96 % | SYSTOLIC BLOOD PRESSURE: 130 MMHG

## 2023-07-19 DIAGNOSIS — I78.0: ICD-10-CM

## 2023-07-19 DIAGNOSIS — H35.3131: ICD-10-CM

## 2023-07-19 PROCEDURE — 93000 ELECTROCARDIOGRAM COMPLETE: CPT

## 2023-07-19 PROCEDURE — 99213 OFFICE O/P EST LOW 20 MIN: CPT | Performed by: SPECIALIST

## 2023-07-19 PROCEDURE — 92134 CPTRZ OPH DX IMG PST SGM RTA: CPT | Performed by: SPECIALIST

## 2023-07-19 PROCEDURE — 99214 OFFICE O/P EST MOD 30 MIN: CPT | Mod: 25

## 2023-07-19 ASSESSMENT — REFRACTION_AUTOREFRACTION
OD_SPHERE: +3.00
OS_CYLINDER: SPH
OS_SPHERE: +3.25
OD_AXIS: 044
OD_CYLINDER: -0.25

## 2023-07-19 ASSESSMENT — TONOMETRY
OD_IOP_MMHG: 16
OS_IOP_MMHG: 14

## 2023-07-19 ASSESSMENT — VISUAL ACUITY
OD_BCVA: 20/40-1
OS_BCVA: 20/50

## 2023-07-19 ASSESSMENT — AXIALLENGTH_DERIVED: OD_AL: 22.8624

## 2023-07-19 ASSESSMENT — KERATOMETRY
OS_AXISANGLE_DEGREES: 091
OD_AXISANGLE_DEGREES: 086
OS_K2POWER_DIOPTERS: 42.75
METHOD_AUTO_MANUAL: AUTO
OD_K2POWER_DIOPTERS: 43.00
OD_K1POWER_DIOPTERS: 42.00
OS_K1POWER_DIOPTERS: 42.25

## 2023-07-19 ASSESSMENT — CONFRONTATIONAL VISUAL FIELD TEST (CVF)
OD_FINDINGS: FULL
OS_FINDINGS: FULL

## 2023-07-19 ASSESSMENT — SPHEQUIV_DERIVED: OD_SPHEQUIV: 2.875

## 2023-08-21 ENCOUNTER — APPOINTMENT (OUTPATIENT)
Dept: ELECTROPHYSIOLOGY | Facility: CLINIC | Age: 73
End: 2023-08-21

## 2023-09-05 ENCOUNTER — NON-APPOINTMENT (OUTPATIENT)
Age: 73
End: 2023-09-05

## 2023-09-06 ENCOUNTER — RESULT REVIEW (OUTPATIENT)
Age: 73
End: 2023-09-06

## 2023-09-06 ENCOUNTER — APPOINTMENT (OUTPATIENT)
Dept: MAMMOGRAPHY | Facility: CLINIC | Age: 73
End: 2023-09-06
Payer: MEDICARE

## 2023-09-06 DIAGNOSIS — Z00.00 ENCOUNTER FOR GENERAL ADULT MEDICAL EXAMINATION W/OUT ABNORMAL FINDINGS: ICD-10-CM

## 2023-09-06 PROCEDURE — 77067 SCR MAMMO BI INCL CAD: CPT

## 2023-09-06 PROCEDURE — 77063 BREAST TOMOSYNTHESIS BI: CPT

## 2023-09-10 ENCOUNTER — APPOINTMENT (OUTPATIENT)
Dept: FAMILY MEDICINE | Facility: CLINIC | Age: 73
End: 2023-09-10
Payer: MEDICARE

## 2023-09-10 VITALS
HEIGHT: 55 IN | OXYGEN SATURATION: 99 % | SYSTOLIC BLOOD PRESSURE: 126 MMHG | TEMPERATURE: 97.8 F | RESPIRATION RATE: 14 BRPM | HEART RATE: 59 BPM | DIASTOLIC BLOOD PRESSURE: 80 MMHG | WEIGHT: 113.6 LBS | BODY MASS INDEX: 26.29 KG/M2

## 2023-09-10 DIAGNOSIS — Z98.890 OTHER SPECIFIED POSTPROCEDURAL STATES: ICD-10-CM

## 2023-09-10 LAB
BILIRUB UR QL STRIP: NEGATIVE
CLARITY UR: CLEAR
COLLECTION METHOD: NORMAL
GLUCOSE UR-MCNC: NEGATIVE
HCG UR QL: 0.2 EU/DL
HGB UR QL STRIP.AUTO: ABNORMAL
KETONES UR-MCNC: NEGATIVE
LEUKOCYTE ESTERASE UR QL STRIP: NEGATIVE
NITRITE UR QL STRIP: NEGATIVE
PH UR STRIP: 5.5
PROT UR STRIP-MCNC: NEGATIVE
SP GR UR STRIP: 1.01

## 2023-09-10 PROCEDURE — G0439: CPT

## 2023-09-10 PROCEDURE — 36415 COLL VENOUS BLD VENIPUNCTURE: CPT

## 2023-09-10 PROCEDURE — 81003 URINALYSIS AUTO W/O SCOPE: CPT | Mod: QW

## 2023-09-10 NOTE — PHYSICAL EXAM
[No Acute Distress] : no acute distress [Well Developed] : well developed [Well Nourished] : well nourished [Well-Appearing] : well-appearing [Normal Sclera/Conjunctiva] : normal sclera/conjunctiva [PERRL] : pupils equal round and reactive to light [EOMI] : extraocular movements intact [Normal Outer Ear/Nose] : the outer ears and nose were normal in appearance [Normal Oropharynx] : the oropharynx was normal [Normal TMs] : both tympanic membranes were normal [No JVD] : no jugular venous distention [No Lymphadenopathy] : no lymphadenopathy [Supple] : supple [Thyroid Normal, No Nodules] : the thyroid was normal and there were no nodules present [No Respiratory Distress] : no respiratory distress  [No Accessory Muscle Use] : no accessory muscle use [Clear to Auscultation] : lungs were clear to auscultation bilaterally [Normal Rate] : normal rate  [Regular Rhythm] : with a regular rhythm [No Murmur] : no murmur heard [Normal S1, S2] : normal S1 and S2 [Tachycardia] : tachycardic [Irregularly Irregular] : irregularly irregular [Pedal Pulses Present] : the pedal pulses are present [No Edema] : there was no peripheral edema [Soft] : abdomen soft [Non Tender] : non-tender [Non-distended] : non-distended [LLQ] : in the left lower quadrant [Normal Posterior Cervical Nodes] : no posterior cervical lymphadenopathy [Normal Anterior Cervical Nodes] : no anterior cervical lymphadenopathy [No CVA Tenderness] : no CVA  tenderness [No Spinal Tenderness] : no spinal tenderness [No Joint Swelling] : no joint swelling [Grossly Normal Strength/Tone] : grossly normal strength/tone [No Rash] : no rash [Coordination Grossly Intact] : coordination grossly intact [No Focal Deficits] : no focal deficits [Normal Gait] : normal gait [Deep Tendon Reflexes (DTR)] : deep tendon reflexes were 2+ and symmetric [Coordination - Dysmetria Impaired Finger-to-Nose Bilateral] : present bilaterally [Normal Affect] : the affect was normal [Alert and Oriented x3] : oriented to person, place, and time [Normal Mood] : the mood was normal [Normal Insight/Judgement] : insight and judgment were intact [de-identified] : SL decreased b/l str 4/5 UE b/l no tremor noted.

## 2023-09-10 NOTE — HEALTH RISK ASSESSMENT
[Good] : ~his/her~  mood as  good [Intercurrent ED visits] : went to ED [No] : In the past 12 months have you used drugs other than those required for medical reasons? No [No falls in past year] : Patient reported no falls in the past year [0] : 2) Feeling down, depressed, or hopeless: Not at all (0) [PHQ-2 Negative - No further assessment needed] : PHQ-2 Negative - No further assessment needed [With Family] : lives with family [Retired] : retired [College] : College [] :  [Feels Safe at Home] : Feels safe at home [Fully functional (bathing, dressing, toileting, transferring, walking, feeding)] : Fully functional (bathing, dressing, toileting, transferring, walking, feeding) [Fully functional (using the telephone, shopping, preparing meals, housekeeping, doing laundry, using] : Fully functional and needs no help or supervision to perform IADLs (using the telephone, shopping, preparing meals, housekeeping, doing laundry, using transportation, managing medications and managing finances) [Reports normal functional visual acuity (ie: able to read med bottle)] : Reports normal functional visual acuity [Smoke Detector] : smoke detector [Carbon Monoxide Detector] : carbon monoxide detector [Seat Belt] :  uses seat belt [Sunscreen] : uses sunscreen [Designated Healthcare Proxy] : Designated healthcare proxy [Name: ___] : Health Care Proxy's Name: [unfilled]  [Relationship: ___] : Relationship: [unfilled] [Never] : Never [FreeTextEntry1] : SETH [de-identified] : 7/2023 - [facility] for elevated heart rate. Followed up with cardio [de-identified] : Dr. Almonte - cardio /  Dr. Joel - pulm [[id number]] : 0 [Reports changes in hearing] : Reports no changes in hearing [Reports changes in vision] : Reports no changes in vision [Reports changes in dental health] : Reports no changes in dental health [Guns at Home] : no guns at home [Safety elements used in home] : no safety elements used in home [Travel to Developing Areas] : does not  travel to developing areas [TB Exposure] : is not being exposed to tuberculosis [Caregiver Concerns] : does not have caregiver concerns [FreeTextEntry2] : [employer]

## 2023-09-10 NOTE — HISTORY OF PRESENT ILLNESS
[FreeTextEntry1] : SETH.   [facility] [de-identified] : Recent Cardioversion in July

## 2023-09-11 LAB
ALBUMIN SERPL ELPH-MCNC: 5 G/DL
ALP BLD-CCNC: 71 U/L
ALT SERPL-CCNC: 11 U/L
ANION GAP SERPL CALC-SCNC: 11 MMOL/L
AST SERPL-CCNC: 24 U/L
BASOPHILS # BLD AUTO: 0.04 K/UL
BASOPHILS NFR BLD AUTO: 0.6 %
BILIRUB SERPL-MCNC: 0.4 MG/DL
BUN SERPL-MCNC: 19 MG/DL
CALCIUM SERPL-MCNC: 10.6 MG/DL
CHLORIDE SERPL-SCNC: 100 MMOL/L
CHOLEST SERPL-MCNC: 194 MG/DL
CO2 SERPL-SCNC: 27 MMOL/L
CREAT SERPL-MCNC: 1.15 MG/DL
EGFR: 50 ML/MIN/1.73M2
EOSINOPHIL # BLD AUTO: 0.14 K/UL
EOSINOPHIL NFR BLD AUTO: 2 %
ESTIMATED AVERAGE GLUCOSE: 114 MG/DL
GLUCOSE SERPL-MCNC: 103 MG/DL
HBA1C MFR BLD HPLC: 5.6 %
HCT VFR BLD CALC: 40.3 %
HDLC SERPL-MCNC: 81 MG/DL
HGB BLD-MCNC: 12.8 G/DL
IMM GRANULOCYTES NFR BLD AUTO: 0.1 %
LDLC SERPL CALC-MCNC: 95 MG/DL
LYMPHOCYTES # BLD AUTO: 2.55 K/UL
LYMPHOCYTES NFR BLD AUTO: 35.7 %
MAN DIFF?: NORMAL
MCHC RBC-ENTMCNC: 31.1 PG
MCHC RBC-ENTMCNC: 31.8 GM/DL
MCV RBC AUTO: 97.8 FL
MONOCYTES # BLD AUTO: 0.58 K/UL
MONOCYTES NFR BLD AUTO: 8.1 %
NEUTROPHILS # BLD AUTO: 3.82 K/UL
NEUTROPHILS NFR BLD AUTO: 53.5 %
NONHDLC SERPL-MCNC: 113 MG/DL
PLATELET # BLD AUTO: 238 K/UL
POTASSIUM SERPL-SCNC: 5 MMOL/L
PROT SERPL-MCNC: 8 G/DL
RBC # BLD: 4.12 M/UL
RBC # FLD: 12.7 %
SODIUM SERPL-SCNC: 139 MMOL/L
TRIGL SERPL-MCNC: 100 MG/DL
TSH SERPL-ACNC: 1.44 UIU/ML
WBC # FLD AUTO: 7.14 K/UL

## 2023-09-17 ENCOUNTER — RX RENEWAL (OUTPATIENT)
Age: 73
End: 2023-09-17

## 2023-10-08 ENCOUNTER — RX RENEWAL (OUTPATIENT)
Age: 73
End: 2023-10-08

## 2023-10-08 RX ORDER — AMLODIPINE BESYLATE 5 MG/1
5 TABLET ORAL
Qty: 90 | Refills: 0 | Status: ACTIVE | COMMUNITY
Start: 2021-07-12 | End: 1900-01-01

## 2023-11-06 ENCOUNTER — RX RENEWAL (OUTPATIENT)
Age: 73
End: 2023-11-06

## 2023-11-06 RX ORDER — SPIRONOLACTONE 50 MG/1
50 TABLET ORAL
Qty: 90 | Refills: 0 | Status: ACTIVE | COMMUNITY
Start: 2020-09-29 | End: 1900-01-01

## 2023-11-08 ENCOUNTER — APPOINTMENT (OUTPATIENT)
Dept: CARDIOLOGY | Facility: CLINIC | Age: 73
End: 2023-11-08
Payer: MEDICARE

## 2023-11-08 VITALS
DIASTOLIC BLOOD PRESSURE: 60 MMHG | HEART RATE: 60 BPM | BODY MASS INDEX: 34.26 KG/M2 | OXYGEN SATURATION: 98 % | WEIGHT: 117 LBS | SYSTOLIC BLOOD PRESSURE: 148 MMHG

## 2023-11-08 PROCEDURE — 99214 OFFICE O/P EST MOD 30 MIN: CPT

## 2023-12-14 ENCOUNTER — RX RENEWAL (OUTPATIENT)
Age: 73
End: 2023-12-14

## 2024-01-25 ENCOUNTER — APPOINTMENT (OUTPATIENT)
Dept: OPHTHALMOLOGY | Facility: CLINIC | Age: 74
End: 2024-01-25

## 2024-02-04 ENCOUNTER — RX RENEWAL (OUTPATIENT)
Age: 74
End: 2024-02-04

## 2024-02-05 ENCOUNTER — RX RENEWAL (OUTPATIENT)
Age: 74
End: 2024-02-05

## 2024-02-15 ENCOUNTER — APPOINTMENT (OUTPATIENT)
Dept: OPHTHALMOLOGY | Facility: CLINIC | Age: 74
End: 2024-02-15
Payer: MEDICARE

## 2024-02-15 ENCOUNTER — NON-APPOINTMENT (OUTPATIENT)
Age: 74
End: 2024-02-15

## 2024-02-15 PROCEDURE — 92004 COMPRE OPH EXAM NEW PT 1/>: CPT

## 2024-02-15 PROCEDURE — 92020 GONIOSCOPY: CPT

## 2024-03-29 ENCOUNTER — RX CHANGE (OUTPATIENT)
Age: 74
End: 2024-03-29

## 2024-03-29 RX ORDER — AMIODARONE HYDROCHLORIDE 200 MG/1
200 TABLET ORAL
Qty: 30 | Refills: 0 | Status: DISCONTINUED | COMMUNITY
Start: 2024-02-05 | End: 2024-03-29

## 2024-03-29 RX ORDER — AMIODARONE HYDROCHLORIDE 200 MG/1
200 TABLET ORAL
Qty: 45 | Refills: 1 | Status: ACTIVE | COMMUNITY
Start: 1900-01-01 | End: 1900-01-01

## 2024-04-14 ENCOUNTER — RX RENEWAL (OUTPATIENT)
Age: 74
End: 2024-04-14

## 2024-04-14 RX ORDER — LEVOCETIRIZINE DIHYDROCHLORIDE 5 MG/1
5 TABLET ORAL
Qty: 30 | Refills: 0 | Status: ACTIVE | COMMUNITY
Start: 2022-06-15 | End: 1900-01-01

## 2024-04-29 RX ORDER — RIVAROXABAN 15 MG/1
15 TABLET, FILM COATED ORAL
Qty: 90 | Refills: 1 | Status: ACTIVE | COMMUNITY
Start: 2023-01-23 | End: 1900-01-01

## 2024-05-07 ENCOUNTER — LABORATORY RESULT (OUTPATIENT)
Age: 74
End: 2024-05-07

## 2024-05-09 ENCOUNTER — NON-APPOINTMENT (OUTPATIENT)
Age: 74
End: 2024-05-09

## 2024-05-14 ENCOUNTER — APPOINTMENT (OUTPATIENT)
Dept: CARDIOLOGY | Facility: CLINIC | Age: 74
End: 2024-05-14
Payer: MEDICARE

## 2024-05-14 VITALS
BODY MASS INDEX: 31.33 KG/M2 | WEIGHT: 107 LBS | DIASTOLIC BLOOD PRESSURE: 60 MMHG | OXYGEN SATURATION: 95 % | HEART RATE: 90 BPM | SYSTOLIC BLOOD PRESSURE: 128 MMHG

## 2024-05-14 DIAGNOSIS — G47.33 OBSTRUCTIVE SLEEP APNEA (ADULT) (PEDIATRIC): ICD-10-CM

## 2024-05-14 DIAGNOSIS — R06.02 SHORTNESS OF BREATH: ICD-10-CM

## 2024-05-14 DIAGNOSIS — G45.9 TRANSIENT CEREBRAL ISCHEMIC ATTACK, UNSPECIFIED: ICD-10-CM

## 2024-05-14 DIAGNOSIS — E78.5 HYPERLIPIDEMIA, UNSPECIFIED: ICD-10-CM

## 2024-05-14 DIAGNOSIS — I10 ESSENTIAL (PRIMARY) HYPERTENSION: ICD-10-CM

## 2024-05-14 DIAGNOSIS — I48.0 PAROXYSMAL ATRIAL FIBRILLATION: ICD-10-CM

## 2024-05-14 DIAGNOSIS — M81.0 AGE-RELATED OSTEOPOROSIS W/OUT CURRENT PATHOLOGICAL FRACTURE: ICD-10-CM

## 2024-05-14 DIAGNOSIS — K21.9 GASTRO-ESOPHAGEAL REFLUX DISEASE W/OUT ESOPHAGITIS: ICD-10-CM

## 2024-05-14 PROCEDURE — G2211 COMPLEX E/M VISIT ADD ON: CPT

## 2024-05-14 PROCEDURE — 99214 OFFICE O/P EST MOD 30 MIN: CPT

## 2024-05-14 NOTE — DISCUSSION/SUMMARY
[FreeTextEntry1] : Paroxysmal atrial fibrillation -she is status post EDWIN guided DC cardioversion on September 10, 2020 ; again on February 25, 2021 ,July 14, 2023 ; currently she is in normal sinus rhythm,  continue amiodarone 100 mg daily.  Continue spironolactone 50  mg daily.   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  Mild sleep apnea -she has normal CPAP machine  Shortness of breath with multiple CAD risk factors -  CT Angio of the coronaries confirmed normal anatomy.  Borderline abnormal PFT -  Dr. Joel   will reevaluate patient sooner and repeat PFT .  At this point I have continue amiodarone 100 mg daily.  Risk factor modifications been discussed with a great length.  She will be reevaluated by me in 6 months after lab testing; currently she is without ACS or CHF.  Labs has been discussed with her.  Repeat TSH after requested.  Repeat echocardiac for evaluation LVEF

## 2024-05-14 NOTE — HISTORY OF PRESENT ILLNESS
[FreeTextEntry1] : The patient is a 73-year-old woman with history of PAF s/p EDWIN guided DC cardioversion on September 10, 2022 and February 24, 2021 on amiodarone; recurrent brief PAF in June 26, 2023 , repeat DC cardioversion on July 14, 2023 at [facility] for recurrent atrial fibrillation  bradycardia quiring discontinuation of diltiazem, history of TIA in past without neuro deficit, mild sleep apnea syndrome and GERD came for cardiac follow-up.   She does not feel dizzy when she stands up.  She is much more energetic and without shortness of breath.  Patient denies any chest pain, PND, orthopnea, diaphoresis, dizziness, palpitations, pedal edema.  She remains very active.      LFT, TFT within normal limit on 1/13/23..  Eye exam was done on 5/9/23, July 23.   PFT by Dr. Joel on June 20, 2022 showed mild reduction in DLCO and restrictive pattern, it was not good effort on the part of the patient; repeat PFT in July 2023.  Repeat PFT all the results not available to me.  Normal LFT as well as TSH on May 8, 2024  December 23, 2020   CT of the chest confirmed normal coronaries with minimal calcium score.  She states she is compliant medication and low-cholesterol diet.

## 2024-05-20 ENCOUNTER — RESULT REVIEW (OUTPATIENT)
Age: 74
End: 2024-05-20

## 2024-05-21 ENCOUNTER — TRANSCRIPTION ENCOUNTER (OUTPATIENT)
Age: 74
End: 2024-05-21

## 2024-06-14 ENCOUNTER — NON-APPOINTMENT (OUTPATIENT)
Age: 74
End: 2024-06-14

## 2024-06-19 ENCOUNTER — APPOINTMENT (OUTPATIENT)
Dept: ELECTROPHYSIOLOGY | Facility: CLINIC | Age: 74
End: 2024-06-19

## 2024-06-19 ENCOUNTER — NON-APPOINTMENT (OUTPATIENT)
Age: 74
End: 2024-06-19

## 2024-06-19 VITALS
HEIGHT: 59 IN | DIASTOLIC BLOOD PRESSURE: 80 MMHG | WEIGHT: 113 LBS | OXYGEN SATURATION: 98 % | SYSTOLIC BLOOD PRESSURE: 132 MMHG | HEART RATE: 68 BPM | BODY MASS INDEX: 22.78 KG/M2

## 2024-06-19 PROCEDURE — 99214 OFFICE O/P EST MOD 30 MIN: CPT | Mod: 25

## 2024-06-19 PROCEDURE — 93000 ELECTROCARDIOGRAM COMPLETE: CPT

## 2024-06-19 NOTE — HISTORY OF PRESENT ILLNESS
[FreeTextEntry1] : Patient is a 74-year-old woman who is seen in follow-up evaluation status post recent hospitalization to [facility] with atrial fibrillation.  She was accompanied by her daughter.  She has a prior history of A-fib and previously underwent EDWIN cardioversion September 2022.  She has been on amiodarone.  She had recurrent A-fib in 2023 and not a cardioversion.  She has a prior history of TIA.  She has a history of mild sleep apnea as well as GERD.  Patient is here today for an opinion regarding possible catheter ablation.  She is currently on amiodarone 200 mg/day patient is on Xarelto.  She had prior PFTs question abnormal.

## 2024-08-22 ENCOUNTER — TRANSCRIPTION ENCOUNTER (OUTPATIENT)
Age: 74
End: 2024-08-22

## 2024-08-27 PROBLEM — Z86.73 PERSONAL HISTORY OF TRANSIENT ISCHEMIC ATTACK (TIA), AND CEREBRAL INFARCTION WITHOUT RESIDUAL DEFICITS: Chronic | Status: ACTIVE | Noted: 2024-08-22

## 2024-08-27 PROBLEM — K21.9 GASTRO-ESOPHAGEAL REFLUX DISEASE WITHOUT ESOPHAGITIS: Chronic | Status: ACTIVE | Noted: 2024-08-22

## 2024-08-27 PROBLEM — G47.33 OBSTRUCTIVE SLEEP APNEA (ADULT) (PEDIATRIC): Chronic | Status: ACTIVE | Noted: 2024-08-22

## 2024-08-27 PROBLEM — I48.0 PAROXYSMAL ATRIAL FIBRILLATION: Chronic | Status: ACTIVE | Noted: 2024-08-22

## 2024-08-30 ENCOUNTER — APPOINTMENT (OUTPATIENT)
Dept: ELECTROPHYSIOLOGY | Facility: CLINIC | Age: 74
End: 2024-08-30
Payer: MEDICARE

## 2024-08-30 VITALS
OXYGEN SATURATION: 98 % | HEART RATE: 81 BPM | SYSTOLIC BLOOD PRESSURE: 132 MMHG | HEIGHT: 59 IN | DIASTOLIC BLOOD PRESSURE: 80 MMHG | WEIGHT: 106 LBS | BODY MASS INDEX: 21.37 KG/M2

## 2024-08-30 DIAGNOSIS — I48.0 PAROXYSMAL ATRIAL FIBRILLATION: ICD-10-CM

## 2024-08-30 PROCEDURE — 99214 OFFICE O/P EST MOD 30 MIN: CPT | Mod: 25

## 2024-08-30 PROCEDURE — 93000 ELECTROCARDIOGRAM COMPLETE: CPT

## 2024-08-30 RX ORDER — RIVAROXABAN 20 MG/1
20 TABLET, FILM COATED ORAL
Qty: 90 | Refills: 3 | Status: ACTIVE | COMMUNITY
Start: 2024-08-30 | End: 1900-01-01

## 2024-09-05 NOTE — DISCUSSION/SUMMARY
[FreeTextEntry1] : schedule EDWIN and then ablation at [facility] -----------------------------  The patient is a 73 yo woman with recurrent symptomatic AF She had recurrences after electrical cardioversion. She has had recurrences despite amiodarone.   She would benefit from maintenance of sinus rhythm based on symptoms and discussed the option of ablation versus medication. She has an elevated chads Vascor and recommend continuing her on anticoagulation.  She has had no bleeding issues. The procedure was discussed in detail with the patient including potential risk, success rate, recurrence rate and redo rates.  Risk including possibility of stroke, bleeding, cardiac perforation, pulmonary vascular injury, esophageal fistula all discussed.She is deciding.  In the meantime she is on amiodarone which we will continue.  Question of prior abnormal PFT which she will get follow-up evaluation. [EKG obtained to assist in diagnosis and management of assessed problem(s)] : EKG obtained to assist in diagnosis and management of assessed problem(s)

## 2024-09-05 NOTE — DISCUSSION/SUMMARY
[FreeTextEntry1] : schedule EDWIN and then ablation at [facility] -----------------------------  The patient is a 75 yo woman with recurrent symptomatic AF She had recurrences after electrical cardioversion. She has had recurrences despite amiodarone.   She would benefit from maintenance of sinus rhythm based on symptoms and discussed the option of ablation versus medication. She has an elevated chads Vascor and recommend continuing her on anticoagulation.  She has had no bleeding issues. The procedure was discussed in detail with the patient including potential risk, success rate, recurrence rate and redo rates.  Risk including possibility of stroke, bleeding, cardiac perforation, pulmonary vascular injury, esophageal fistula all discussed.She is deciding.  In the meantime she is on amiodarone which we will continue.  Question of prior abnormal PFT which she will get follow-up evaluation. [EKG obtained to assist in diagnosis and management of assessed problem(s)] : EKG obtained to assist in diagnosis and management of assessed problem(s)

## 2024-09-05 NOTE — HISTORY OF PRESENT ILLNESS
[FreeTextEntry1] : f/u s/p attempted ablation  She was accompanied by her daughter.  She has a prior history of A-fib and previously underwent EDWIN cardioversion September 2022.  She has been on amiodarone.  She had recurrent A-fib in 2023 and underwent  cardioversion.  She has a prior history of TIA.  She has a history of mild sleep apnea as well as GERD.  Patient is here today for an opinion regarding possible catheter ablation.  She is currently on amiodarone 200 mg/day patient is on Xarelto.  She had prior PFTs question abnormal.  Symptoms during AF: mostly unaware. Some palps, fatigue

## 2024-09-05 NOTE — PHYSICAL EXAM
[No Acute Distress] : no acute distress [Normal Venous Pressure] : normal venous pressure [Normal S1, S2] : normal S1, S2 [Clear Lung Fields] : clear lung fields [Normal Gait] : normal gait [No Edema] : no edema [No Cyanosis] : no cyanosis [No Focal Deficits] : no focal deficits

## 2024-09-05 NOTE — REVIEW OF SYSTEMS
[Feeling Fatigued] : feeling fatigued [SOB] : no shortness of breath [Chest Discomfort] : no chest discomfort [Cough] : no cough [Abdominal Pain] : no abdominal pain [Dizziness] : no dizziness [Easy Bleeding] : no tendency for easy bleeding

## 2024-09-12 ENCOUNTER — APPOINTMENT (OUTPATIENT)
Dept: CT IMAGING | Facility: CLINIC | Age: 74
End: 2024-09-12

## 2024-09-12 PROCEDURE — 71250 CT THORAX DX C-: CPT

## 2024-09-17 ENCOUNTER — APPOINTMENT (OUTPATIENT)
Dept: MAMMOGRAPHY | Facility: CLINIC | Age: 74
End: 2024-09-17
Payer: MEDICARE

## 2024-09-17 ENCOUNTER — RESULT REVIEW (OUTPATIENT)
Age: 74
End: 2024-09-17

## 2024-09-17 PROCEDURE — 77067 SCR MAMMO BI INCL CAD: CPT

## 2024-09-17 PROCEDURE — 77063 BREAST TOMOSYNTHESIS BI: CPT

## 2024-10-10 ENCOUNTER — NON-APPOINTMENT (OUTPATIENT)
Age: 74
End: 2024-10-10

## 2024-10-10 ENCOUNTER — RESULT CHARGE (OUTPATIENT)
Age: 74
End: 2024-10-10

## 2024-10-10 ENCOUNTER — APPOINTMENT (OUTPATIENT)
Dept: CARDIOLOGY | Facility: CLINIC | Age: 74
End: 2024-10-10
Payer: MEDICARE

## 2024-10-10 VITALS
DIASTOLIC BLOOD PRESSURE: 84 MMHG | OXYGEN SATURATION: 99 % | SYSTOLIC BLOOD PRESSURE: 122 MMHG | HEIGHT: 59 IN | HEART RATE: 110 BPM | RESPIRATION RATE: 14 BRPM | BODY MASS INDEX: 20.96 KG/M2 | WEIGHT: 104 LBS

## 2024-10-10 PROCEDURE — 93000 ELECTROCARDIOGRAM COMPLETE: CPT

## 2024-10-10 PROCEDURE — 99214 OFFICE O/P EST MOD 30 MIN: CPT

## 2024-10-10 RX ORDER — SPIRONOLACTONE 25 MG/1
25 TABLET ORAL DAILY
Refills: 0 | Status: ACTIVE | COMMUNITY

## 2024-10-10 RX ORDER — AMIODARONE HYDROCHLORIDE 200 MG/1
200 TABLET ORAL TWICE DAILY
Refills: 0 | Status: ACTIVE | COMMUNITY

## 2024-10-15 RX ORDER — METOPROLOL SUCCINATE 25 MG/1
25 TABLET, EXTENDED RELEASE ORAL
Qty: 270 | Refills: 0 | Status: ACTIVE | COMMUNITY
Start: 1900-01-01 | End: 1900-01-01

## 2024-11-19 ENCOUNTER — NON-APPOINTMENT (OUTPATIENT)
Age: 74
End: 2024-11-19

## 2024-11-19 ENCOUNTER — APPOINTMENT (OUTPATIENT)
Dept: CARDIOLOGY | Facility: CLINIC | Age: 74
End: 2024-11-19
Payer: MEDICARE

## 2024-11-19 VITALS
DIASTOLIC BLOOD PRESSURE: 90 MMHG | SYSTOLIC BLOOD PRESSURE: 138 MMHG | BODY MASS INDEX: 21.01 KG/M2 | OXYGEN SATURATION: 99 % | HEART RATE: 93 BPM | WEIGHT: 104 LBS

## 2024-11-19 DIAGNOSIS — M81.0 AGE-RELATED OSTEOPOROSIS W/OUT CURRENT PATHOLOGICAL FRACTURE: ICD-10-CM

## 2024-11-19 DIAGNOSIS — K21.9 GASTRO-ESOPHAGEAL REFLUX DISEASE W/OUT ESOPHAGITIS: ICD-10-CM

## 2024-11-19 DIAGNOSIS — E78.5 HYPERLIPIDEMIA, UNSPECIFIED: ICD-10-CM

## 2024-11-19 DIAGNOSIS — I10 ESSENTIAL (PRIMARY) HYPERTENSION: ICD-10-CM

## 2024-11-19 DIAGNOSIS — G47.33 OBSTRUCTIVE SLEEP APNEA (ADULT) (PEDIATRIC): ICD-10-CM

## 2024-11-19 DIAGNOSIS — I48.0 PAROXYSMAL ATRIAL FIBRILLATION: ICD-10-CM

## 2024-11-19 DIAGNOSIS — G45.9 TRANSIENT CEREBRAL ISCHEMIC ATTACK, UNSPECIFIED: ICD-10-CM

## 2024-11-19 DIAGNOSIS — R06.02 SHORTNESS OF BREATH: ICD-10-CM

## 2024-11-19 PROCEDURE — 93306 TTE W/DOPPLER COMPLETE: CPT

## 2024-11-19 PROCEDURE — G2211 COMPLEX E/M VISIT ADD ON: CPT

## 2024-11-19 PROCEDURE — 93000 ELECTROCARDIOGRAM COMPLETE: CPT

## 2024-11-19 PROCEDURE — 99214 OFFICE O/P EST MOD 30 MIN: CPT

## 2024-11-22 ENCOUNTER — NON-APPOINTMENT (OUTPATIENT)
Age: 74
End: 2024-11-22

## 2024-12-14 ENCOUNTER — RX RENEWAL (OUTPATIENT)
Age: 74
End: 2024-12-14

## 2024-12-19 ENCOUNTER — TRANSCRIPTION ENCOUNTER (OUTPATIENT)
Age: 74
End: 2024-12-19

## 2024-12-19 ENCOUNTER — INPATIENT (INPATIENT)
Facility: HOSPITAL | Age: 74
LOS: 0 days | Discharge: HOME | DRG: 310 | End: 2024-12-20
Attending: INTERNAL MEDICINE | Admitting: INTERNAL MEDICINE
Payer: MEDICARE

## 2024-12-19 ENCOUNTER — RESULT REVIEW (OUTPATIENT)
Age: 74
End: 2024-12-19

## 2024-12-19 VITALS
TEMPERATURE: 98 F | RESPIRATION RATE: 17 BRPM | HEIGHT: 59.5 IN | OXYGEN SATURATION: 97 % | DIASTOLIC BLOOD PRESSURE: 99 MMHG | SYSTOLIC BLOOD PRESSURE: 146 MMHG | HEART RATE: 86 BPM | WEIGHT: 100.09 LBS

## 2024-12-19 DIAGNOSIS — I48.0 PAROXYSMAL ATRIAL FIBRILLATION: ICD-10-CM

## 2024-12-19 DIAGNOSIS — Z98.891 HISTORY OF UTERINE SCAR FROM PREVIOUS SURGERY: Chronic | ICD-10-CM

## 2024-12-19 LAB
ANION GAP SERPL CALC-SCNC: 12 MMOL/L — SIGNIFICANT CHANGE UP (ref 5–17)
APTT BLD: 36.4 SEC — HIGH (ref 24.5–35.6)
BLD GP AB SCN SERPL QL: SIGNIFICANT CHANGE UP
BUN SERPL-MCNC: 23.9 MG/DL — HIGH (ref 8–20)
CALCIUM SERPL-MCNC: 9.8 MG/DL — SIGNIFICANT CHANGE UP (ref 8.4–10.5)
CHLORIDE SERPL-SCNC: 103 MMOL/L — SIGNIFICANT CHANGE UP (ref 96–108)
CO2 SERPL-SCNC: 25 MMOL/L — SIGNIFICANT CHANGE UP (ref 22–29)
CREAT SERPL-MCNC: 1.2 MG/DL — SIGNIFICANT CHANGE UP (ref 0.5–1.3)
EGFR: 47 ML/MIN/1.73M2 — LOW
GLUCOSE SERPL-MCNC: 97 MG/DL — SIGNIFICANT CHANGE UP (ref 70–99)
HCT VFR BLD CALC: 40.2 % — SIGNIFICANT CHANGE UP (ref 34.5–45)
HGB BLD-MCNC: 13.5 G/DL — SIGNIFICANT CHANGE UP (ref 11.5–15.5)
INR BLD: 1.73 RATIO — HIGH (ref 0.85–1.16)
MAGNESIUM SERPL-MCNC: 2 MG/DL — SIGNIFICANT CHANGE UP (ref 1.6–2.6)
MCHC RBC-ENTMCNC: 32 PG — SIGNIFICANT CHANGE UP (ref 27–34)
MCHC RBC-ENTMCNC: 33.6 G/DL — SIGNIFICANT CHANGE UP (ref 32–36)
MCV RBC AUTO: 95.3 FL — SIGNIFICANT CHANGE UP (ref 80–100)
PLATELET # BLD AUTO: 238 K/UL — SIGNIFICANT CHANGE UP (ref 150–400)
POTASSIUM SERPL-MCNC: 4.6 MMOL/L — SIGNIFICANT CHANGE UP (ref 3.5–5.3)
POTASSIUM SERPL-SCNC: 4.6 MMOL/L — SIGNIFICANT CHANGE UP (ref 3.5–5.3)
PROTHROM AB SERPL-ACNC: 20 SEC — HIGH (ref 9.9–13.4)
RBC # BLD: 4.22 M/UL — SIGNIFICANT CHANGE UP (ref 3.8–5.2)
RBC # FLD: 13.5 % — SIGNIFICANT CHANGE UP (ref 10.3–14.5)
SODIUM SERPL-SCNC: 140 MMOL/L — SIGNIFICANT CHANGE UP (ref 135–145)
WBC # BLD: 8.89 K/UL — SIGNIFICANT CHANGE UP (ref 3.8–10.5)
WBC # FLD AUTO: 8.89 K/UL — SIGNIFICANT CHANGE UP (ref 3.8–10.5)

## 2024-12-19 PROCEDURE — 93656 COMPRE EP EVAL ABLTJ ATR FIB: CPT

## 2024-12-19 PROCEDURE — 93325 DOPPLER ECHO COLOR FLOW MAPG: CPT | Mod: 26

## 2024-12-19 PROCEDURE — 93312 ECHO TRANSESOPHAGEAL: CPT | Mod: 26

## 2024-12-19 PROCEDURE — 93657 TX L/R ATRIAL FIB ADDL: CPT

## 2024-12-19 PROCEDURE — 93010 ELECTROCARDIOGRAM REPORT: CPT

## 2024-12-19 PROCEDURE — 93320 DOPPLER ECHO COMPLETE: CPT | Mod: 26

## 2024-12-19 RX ORDER — ONDANSETRON HYDROCHLORIDE 4 MG/1
4 TABLET, FILM COATED ORAL EVERY 6 HOURS
Refills: 0 | Status: DISCONTINUED | OUTPATIENT
Start: 2024-12-19 | End: 2024-12-20

## 2024-12-19 RX ORDER — RIVAROXABAN 10 MG/1
20 TABLET, FILM COATED ORAL DAILY
Refills: 0 | Status: DISCONTINUED | OUTPATIENT
Start: 2024-12-19 | End: 2024-12-20

## 2024-12-19 RX ORDER — RIVAROXABAN 10 MG/1
1 TABLET, FILM COATED ORAL
Refills: 0 | DISCHARGE

## 2024-12-19 RX ORDER — SPIRONOLACTONE 25 MG
25 TABLET ORAL DAILY
Refills: 0 | Status: DISCONTINUED | OUTPATIENT
Start: 2024-12-19 | End: 2024-12-20

## 2024-12-19 RX ORDER — ALPRAZOLAM 0.5 MG
0.25 TABLET ORAL EVERY 6 HOURS
Refills: 0 | Status: DISCONTINUED | OUTPATIENT
Start: 2024-12-19 | End: 2024-12-20

## 2024-12-19 RX ORDER — BENZOCAINE, MENTHOL 15; 3.6 MG/1; MG/1
1 LOZENGE ORAL
Refills: 0 | Status: DISCONTINUED | OUTPATIENT
Start: 2024-12-19 | End: 2024-12-20

## 2024-12-19 RX ORDER — MAGNESIUM, ALUMINUM HYDROXIDE 200-225/5
30 SUSPENSION, ORAL (FINAL DOSE FORM) ORAL EVERY 6 HOURS
Refills: 0 | Status: DISCONTINUED | OUTPATIENT
Start: 2024-12-19 | End: 2024-12-20

## 2024-12-19 RX ORDER — SODIUM CHLORIDE 9 MG/ML
1000 INJECTION, SOLUTION INTRAMUSCULAR; INTRAVENOUS; SUBCUTANEOUS
Refills: 0 | Status: DISCONTINUED | OUTPATIENT
Start: 2024-12-19 | End: 2024-12-20

## 2024-12-19 RX ORDER — AMIODARONE HYDROCHLORIDE 200 MG/1
200 TABLET ORAL DAILY
Refills: 0 | Status: DISCONTINUED | OUTPATIENT
Start: 2024-12-20 | End: 2024-12-20

## 2024-12-19 RX ORDER — SPIRONOLACTONE 25 MG
1 TABLET ORAL
Refills: 0 | DISCHARGE

## 2024-12-19 RX ORDER — ACETAMINOPHEN 500MG 500 MG/1
650 TABLET, COATED ORAL EVERY 6 HOURS
Refills: 0 | Status: DISCONTINUED | OUTPATIENT
Start: 2024-12-19 | End: 2024-12-20

## 2024-12-19 RX ADMIN — RIVAROXABAN 20 MILLIGRAM(S): 10 TABLET, FILM COATED ORAL at 18:50

## 2024-12-19 NOTE — H&P PST ADULT - ASSESSMENT
74-year-old woman with history of mild RON< TIA, GERD, PAF s/p prior EDWIN guided DCCV x 4 (most recent 5/2024) developed recurrent atrial fibrillation despite Amiodarone initiation, and planned for AF ablation on 8/2024 but procedure aborted due to Smoke visualized in the JULIA via ICE catheter with access for ICE cathete.  Was continued on Amiodarone and uninterrupted Xarelto 20mg daily   Presents today for EDWIN  followed by ablation of the left atrial appendage is cleared.  Doing well and denies recent complaints    Confirms NPO > 8 hours  Last dose of Xarelto yesterday AM    - IV Heparin lock  - STAT labs and Type and screen  - 2 units of prbc on hold  - Consent to be obtained by THIERRY WHITFIELD  - Maintain NPO status for procedure

## 2024-12-19 NOTE — DISCHARGE NOTE PROVIDER - CARE PROVIDER_API CALL
Osmin Bergman.  Cardiac Electrophysiology  [address] [place] [id number]  Phone: [phone]  Fax: [phone]  Follow Up Time: Routine

## 2024-12-19 NOTE — DISCHARGE NOTE PROVIDER - HOSPITAL COURSE
74-year-old woman with history of mild RON, TIA, GERD, PAF s/p prior EDWIN guided DCCV x 4 (most recent 5/2024) who developed recurrent atrial fibrillation despite Amiodarone initiation, and planned for AF ablation on 8/2024 but procedure aborted due to Smoke visualized in the JULIA via ICE catheter with access for ICE cathete.  Was continued on Amiodarone and uninterrupted Xarelto 20mg daily    She presented elective and underwent EDWIN was negative for JULIA thrombus. Now s/p ablation of AFib and Aflutter (PFA with PVI, Anterior Mitral line; RFA of CTI) via b/l FV access.

## 2024-12-19 NOTE — DISCHARGE NOTE PROVIDER - NSDCFUSCHEDAPPT_GEN_ALL_CORE_FT
[facility]  CARDIOLOGY [address]  Scheduled Appointment: 01/07/2025    Elmer Mendez  [facility]  OPHTHALM [address]  Scheduled Appointment: 01/23/2025

## 2024-12-19 NOTE — DISCHARGE NOTE PROVIDER - NSDCFUADDINST_GEN_ALL_CORE_FT
Discontinue Metoprolol.  Continue Amiodarone 200mg, ONCE daily  Recommend outpatient follow-up with Dr. Bergman in 2-3 weeks.

## 2024-12-19 NOTE — DISCHARGE NOTE PROVIDER - NSDCMRMEDTOKEN_GEN_ALL_CORE_FT
amiodarone 200 mg oral tablet: 1 tab(s) orally 2 times a day  levocetirizine 5 mg oral tablet: 1 tab(s) orally once a day  Metoprolol Tartrate 25 mg oral tablet: 1 tab(s) orally once a day in the morning  metoprolol tartrate 50 mg oral tablet: 1 tab(s) orally once a day (at bedtime)  spironolactone 25 mg oral tablet: 1 tab(s) orally once a day  Xarelto 20 mg oral tablet: 1 tab(s) orally once a day   amiodarone 200 mg oral tablet: 1 tab(s) orally once a day  levocetirizine 5 mg oral tablet: 1 tab(s) orally once a day  spironolactone 25 mg oral tablet: 1 tab(s) orally once a day  Xarelto 20 mg oral tablet: 1 tab(s) orally once a day

## 2024-12-19 NOTE — H&P PST ADULT - NSICDXPASTMEDICALHX_GEN_ALL_CORE_FT
PAST MEDICAL HISTORY:  GERD (gastroesophageal reflux disease)     History of TIAs     RON (obstructive sleep apnea)     Paroxysmal atrial fibrillation

## 2024-12-19 NOTE — PROGRESS NOTE ADULT - SUBJECTIVE AND OBJECTIVE BOX
PROCEDURE(S): Pulsed Field Ablation of Atrial Fibrillation    ELECTRPHYSIOLOGIST(S): Shaheen Baron MD         COMPLICATIONS:  none        DISPOSITION:  observation     CONDITION: stable  EBL: <15mL    Pt doing well s/p ablation of AFib and Aflutter (PFA of PVI, Anterior Mitral line; RFA of CTI). Denies complaint.       MEDICATIONS  (STANDING):  rivaroxaban 20 milliGRAM(s) Oral daily  spironolactone 25 milliGRAM(s) Oral daily    MEDICATIONS  (PRN):  acetaminophen     Tablet .. 650 milliGRAM(s) Oral every 6 hours PRN Mild Pain (1 - 3), Moderate Pain (4 - 6)  ALPRAZolam 0.25 milliGRAM(s) Oral every 6 hours PRN anxiety/insomnia  aluminum hydroxide/magnesium hydroxide/simethicone Suspension 30 milliLiter(s) Oral every 6 hours PRN Dyspepsia  benzocaine/menthol Lozenge 1 Lozenge Oral every 2 hours PRN Sore Throat  ondansetron Injectable 4 milliGRAM(s) IV Push every 6 hours PRN Nausea and/or Vomiting      Allergies  No Known Allergies        Exam:   T(C): 36.6 (12-19-24 @ 09:31), Max: 36.6 (12-19-24 @ 09:31)  HR: 75 (12-19-24 @ 09:31) (75 - 86)  BP: 146/89 (12-19-24 @ 09:31) (146/89 - 146/89)  RR: 16 (12-19-24 @ 09:31) (16 - 17)  SpO2: 97% (12-19-24 @ 09:31) (97% - 97%)  Wt(kg): --  VSS, NAD, A&O x 3  Card: S1/S2, RRR, no m/g/r  Resp: lungs CTA b/l  Abd: S/NT/ND  Groins: hemostatic sutures in place; sites C/D/I; no bleeding, hematoma, erythema, exudate or edema  Ext: no edema; distal pulses intact    I/Os: net +     ECG:    Assessment:   74y Female h/o ***.  Now status post uncomplicated pulsed field ablation of atrial fibrillation via b/l femoral venous access.      Plan:   Bedrest x 4 hours, then OOB with assistance and progress as tolerated.   Groin sutures to be removed by EP service in AM.   Radial art line to be removed once pt fully awake with stable vitals >1 hour post op.   Pending groin status: *** ***mg PO *** to resume at *** tonight.   DO NOT HOLD, INTERRUPT OR REVERSE ANTICOAGULATION WITHOUT EXPLICIT APPROVAL FROM EP SERVICE.    Lasix 20mg IV x 1 dose once ambulating, then Lasix 20mg PO daily x 3 days.   Continue ***.    Discontinue ***.   Continue other home medications.   Start Protonix 40mg once daily x 1 month.   Strict I/Os.  Please encourage incentive spirometry and ambulation once able.  Observation and monitoring on telemetry overnight with anticipated discharge in the AM and outpt follow up in 2-4 weeks.  PROCEDURE(S): Pulsed Field Ablation of Atrial Fibrillation    ELECTROPHYSIOLOGIST(S): Osmin Bergman MD         COMPLICATIONS:  none        DISPOSITION:  observation     CONDITION: stable  EBL: <15mL    Pt doing well s/p ablation of AFib and Aflutter (PFA of PVI, Anterior Mitral line; RFA of CTI). Denies complaint.       MEDICATIONS  (STANDING):  rivaroxaban 20 milliGRAM(s) Oral daily  spironolactone 25 milliGRAM(s) Oral daily    MEDICATIONS  (PRN):  acetaminophen     Tablet .. 650 milliGRAM(s) Oral every 6 hours PRN Mild Pain (1 - 3), Moderate Pain (4 - 6)  ALPRAZolam 0.25 milliGRAM(s) Oral every 6 hours PRN anxiety/insomnia  aluminum hydroxide/magnesium hydroxide/simethicone Suspension 30 milliLiter(s) Oral every 6 hours PRN Dyspepsia  benzocaine/menthol Lozenge 1 Lozenge Oral every 2 hours PRN Sore Throat  ondansetron Injectable 4 milliGRAM(s) IV Push every 6 hours PRN Nausea and/or Vomiting      Allergies  No Known Allergies        Exam:   T(C): 36.6 (12-19-24 @ 09:31), Max: 36.6 (12-19-24 @ 09:31)  HR: 75 (12-19-24 @ 09:31) (75 - 86)  BP: 146/89 (12-19-24 @ 09:31) (146/89 - 146/89)  RR: 16 (12-19-24 @ 09:31) (16 - 17)  SpO2: 97% (12-19-24 @ 09:31) (97% - 97%)    VSS, NAD, sleepy but arousable  Card: S1/S2, RRR, no m/g/r  Resp: lungs CTA b/l  Abd: S/NT/ND  Groins: b/l hemostatic sutures in place; sites C/D/I; no bleeding, hematoma, erythema, exudate or edema  Ext: no edema; distal pulses intact    I/Os: net + 1200cc    ECG:  PENDING    Assessment:   74-year-old woman with history of mild RON, TIA, GERD, PAF s/p prior EDWIN guided DCCV x 4 (most recent 5/2024) who developed recurrent atrial fibrillation despite Amiodarone initiation, and planned for AF ablation on 8/2024 but procedure aborted due to Smoke visualized in the JULIA via ICE catheter with access for ICE cathete.  Was continued on Amiodarone and uninterrupted Xarelto 20mg daily    She presented elective and underwent EDWIN which was notable for smoke but no JULIA thrombus. Now s/p ablation of AFib and Aflutter (PFA of PVI, Anterior Mitral line; RFA of CTI) via b/l FV access.    Plan:   Bedrest x 4 hours, then OOB with assistance and progress as tolerated.   Groin sutures to be removed by EP service in AM.   Pending groin status: Xarelto 20mg PO at 1900  DO NOT HOLD, INTERRUPT OR REVERSE ANTICOAGULATION WITHOUT EXPLICIT APPROVAL FROM EP SERVICE.    Discontinue Metoprolol due to baseline sinus bradycardia in the 40-50s.  Continue Amiodarone at decreased dose of 200mg daily.  IVF overnight with NS @ 50cc/hr  Continue other home medications.   Strict I/Os.  Please encourage incentive spirometry and ambulation once able.  Observation and monitoring on telemetry overnight with anticipated discharge in the AM and outpt follow up in 2-4 weeks.

## 2024-12-19 NOTE — H&P PST ADULT - HISTORY OF PRESENT ILLNESS
74-year-old woman with history of mild RON< TIA, GERD, PAF s/p prior EDWIN guided DCCV x 4 (most recent 5/2024) developed recurrent atrial fibrillation despite Amiodarone initiation, and planned for AF ablation on 8/2024 but procedure aborted due to Smoke visualized in the JULIA via ICE catheter with access for ICE cathete.  Was continued on Amiodarone and uninterrupted a/c and presents today for EDWIN  followed by ablation of the left atrial appendage is cleared.       CARDIOLOGY SUMMARY  TTE 11/19/24:  CONCLUSIONS:    1. Left ventricular cavity is small. Left ventricular systolic function is normal with an ejection fraction of 55 % by Pal's method of disks.  2. The left ventricular diastolic function is indeterminate.  3. Mild to moderate left ventricular hypertrophy.  4. Left atrium is severely dilated.  5. Reduced right ventricular systolic function. Tricuspid annular plane systolic excursion (TAPSE) is 1.2 cm (normal >=1.7 cm). Tricuspid annular tissue Doppler S' is 7.0 cm/s (normal >10 cm/s).  6. The right atrium is dilated.  7. Trileaflet aortic valve with reduced systolic excursion. Fibrocalcific aortic valve sclerosis without stenosis.  8. Trace aortic regurgitation.  9. Ascending aorta is dilated, measuring 3.51 cm (indexed 2.45 cm/m²). Aortic arch diameter is dilated, measuring 3.6 cm (indexed 2.52 cm/m²).  10. Moderate mitral regurgitation.  11. Moderate tricuspid regurgitation.  12. Trace pulmonic regurgitation.  13. Estimated pulmonary artery systolic pressure is 35 mmHg, consistent with mild pulmonary hypertension.  14. No pericardial effusion seen.  15. Analysis o fleft ventricular diastoloic function and filling pressure is made challenging by the presence of atrial fibrillation.  16. Compared to the transthoracic echocardiogram performed on 5/20/2024, Increase in MR severity, trace AI.            ? 74-year-old woman with history of mild RON< TIA, GERD, PAF s/p prior EDWIN guided DCCV x 4 (most recent 5/2024) developed recurrent atrial fibrillation despite Amiodarone initiation, and planned for AF ablation on 8/2024 but procedure aborted due to Smoke visualized in the JULIA via ICE catheter with access for ICE cathete.  Was continued on Amiodarone and uninterrupted a/c and presents today for EDWIN  followed by ablation of the left atrial appendage is cleared.       CARDIOLOGY SUMMARY  EKG 12/19/24:  AF @ 86bpm  TTE 11/19/24:  CONCLUSIONS:  1. Left ventricular cavity is small. Left ventricular systolic function is normal with an ejection fraction of 55 % by Pal's method of disks.  2. The left ventricular diastolic function is indeterminate.  3. Mild to moderate left ventricular hypertrophy.  4. Left atrium is severely dilated.  5. Reduced right ventricular systolic function. Tricuspid annular plane systolic excursion (TAPSE) is 1.2 cm (normal >=1.7 cm). Tricuspid annular tissue Doppler S' is 7.0 cm/s (normal >10 cm/s).  6. The right atrium is dilated.  7. Trileaflet aortic valve with reduced systolic excursion. Fibrocalcific aortic valve sclerosis without stenosis.  8. Trace aortic regurgitation.  9. Ascending aorta is dilated, measuring 3.51 cm (indexed 2.45 cm/m²). Aortic arch diameter is dilated, measuring 3.6 cm (indexed 2.52 cm/m²).  10. Moderate mitral regurgitation.  11. Moderate tricuspid regurgitation.  12. Trace pulmonic regurgitation.  13. Estimated pulmonary artery systolic pressure is 35 mmHg, consistent with mild pulmonary hypertension.  14. No pericardial effusion seen.  15. Analysis o fleft ventricular diastoloic function and filling pressure is made challenging by the presence of atrial fibrillation.  16. Compared to the transthoracic echocardiogram performed on 5/20/2024, Increase in MR severity, trace AI.            ?

## 2024-12-20 ENCOUNTER — TRANSCRIPTION ENCOUNTER (OUTPATIENT)
Age: 74
End: 2024-12-20

## 2024-12-20 VITALS
DIASTOLIC BLOOD PRESSURE: 55 MMHG | SYSTOLIC BLOOD PRESSURE: 90 MMHG | TEMPERATURE: 97 F | OXYGEN SATURATION: 96 % | HEART RATE: 47 BPM | RESPIRATION RATE: 18 BRPM

## 2024-12-20 LAB
ANION GAP SERPL CALC-SCNC: 17 MMOL/L — SIGNIFICANT CHANGE UP (ref 5–17)
BUN SERPL-MCNC: 29.1 MG/DL — HIGH (ref 8–20)
CALCIUM SERPL-MCNC: 8.1 MG/DL — LOW (ref 8.4–10.5)
CHLORIDE SERPL-SCNC: 109 MMOL/L — HIGH (ref 96–108)
CO2 SERPL-SCNC: 15 MMOL/L — LOW (ref 22–29)
CREAT SERPL-MCNC: 1.08 MG/DL — SIGNIFICANT CHANGE UP (ref 0.5–1.3)
EGFR: 53 ML/MIN/1.73M2 — LOW
GLUCOSE SERPL-MCNC: 106 MG/DL — HIGH (ref 70–99)
HCT VFR BLD CALC: 32.5 % — LOW (ref 34.5–45)
HGB BLD-MCNC: 10.7 G/DL — LOW (ref 11.5–15.5)
MAGNESIUM SERPL-MCNC: 1.7 MG/DL — SIGNIFICANT CHANGE UP (ref 1.6–2.6)
MCHC RBC-ENTMCNC: 31.7 PG — SIGNIFICANT CHANGE UP (ref 27–34)
MCHC RBC-ENTMCNC: 32.9 G/DL — SIGNIFICANT CHANGE UP (ref 32–36)
MCV RBC AUTO: 96.2 FL — SIGNIFICANT CHANGE UP (ref 80–100)
PLATELET # BLD AUTO: 192 K/UL — SIGNIFICANT CHANGE UP (ref 150–400)
POTASSIUM SERPL-MCNC: 4.8 MMOL/L — SIGNIFICANT CHANGE UP (ref 3.5–5.3)
POTASSIUM SERPL-SCNC: 4.8 MMOL/L — SIGNIFICANT CHANGE UP (ref 3.5–5.3)
RBC # BLD: 3.38 M/UL — LOW (ref 3.8–5.2)
RBC # FLD: 14.6 % — HIGH (ref 10.3–14.5)
SODIUM SERPL-SCNC: 140 MMOL/L — SIGNIFICANT CHANGE UP (ref 135–145)
WBC # BLD: 10.09 K/UL — SIGNIFICANT CHANGE UP (ref 3.8–10.5)
WBC # FLD AUTO: 10.09 K/UL — SIGNIFICANT CHANGE UP (ref 3.8–10.5)

## 2024-12-20 PROCEDURE — 85730 THROMBOPLASTIN TIME PARTIAL: CPT

## 2024-12-20 PROCEDURE — C1887: CPT

## 2024-12-20 PROCEDURE — 93656 COMPRE EP EVAL ABLTJ ATR FIB: CPT

## 2024-12-20 PROCEDURE — C1731: CPT

## 2024-12-20 PROCEDURE — C1733: CPT

## 2024-12-20 PROCEDURE — C1769: CPT

## 2024-12-20 PROCEDURE — 83735 ASSAY OF MAGNESIUM: CPT

## 2024-12-20 PROCEDURE — 93325 DOPPLER ECHO COLOR FLOW MAPG: CPT

## 2024-12-20 PROCEDURE — 93010 ELECTROCARDIOGRAM REPORT: CPT

## 2024-12-20 PROCEDURE — 93657 TX L/R ATRIAL FIB ADDL: CPT

## 2024-12-20 PROCEDURE — C9399: CPT

## 2024-12-20 PROCEDURE — 86900 BLOOD TYPING SEROLOGIC ABO: CPT

## 2024-12-20 PROCEDURE — 93320 DOPPLER ECHO COMPLETE: CPT

## 2024-12-20 PROCEDURE — C8925: CPT

## 2024-12-20 PROCEDURE — 36415 COLL VENOUS BLD VENIPUNCTURE: CPT

## 2024-12-20 PROCEDURE — C1732: CPT

## 2024-12-20 PROCEDURE — 85027 COMPLETE CBC AUTOMATED: CPT

## 2024-12-20 PROCEDURE — 86850 RBC ANTIBODY SCREEN: CPT

## 2024-12-20 PROCEDURE — C1766: CPT

## 2024-12-20 PROCEDURE — C1894: CPT

## 2024-12-20 PROCEDURE — 86901 BLOOD TYPING SEROLOGIC RH(D): CPT

## 2024-12-20 PROCEDURE — 80048 BASIC METABOLIC PNL TOTAL CA: CPT

## 2024-12-20 PROCEDURE — 85610 PROTHROMBIN TIME: CPT

## 2024-12-20 PROCEDURE — C1759: CPT

## 2024-12-20 PROCEDURE — 93005 ELECTROCARDIOGRAM TRACING: CPT

## 2024-12-20 RX ORDER — METOPROLOL TARTRATE 100 MG/1
1 TABLET, FILM COATED ORAL
Refills: 0 | DISCHARGE

## 2024-12-20 RX ORDER — AMIODARONE HYDROCHLORIDE 200 MG/1
1 TABLET ORAL
Qty: 0 | Refills: 0 | DISCHARGE
Start: 2024-12-20

## 2024-12-20 RX ORDER — AMIODARONE HYDROCHLORIDE 200 MG/1
1 TABLET ORAL
Refills: 0 | DISCHARGE

## 2024-12-20 RX ADMIN — Medication 25 GRAM(S): at 10:09

## 2024-12-20 RX ADMIN — Medication 25 MILLIGRAM(S): at 05:54

## 2024-12-20 RX ADMIN — RIVAROXABAN 20 MILLIGRAM(S): 10 TABLET, FILM COATED ORAL at 10:09

## 2024-12-20 RX ADMIN — AMIODARONE HYDROCHLORIDE 200 MILLIGRAM(S): 200 TABLET ORAL at 05:54

## 2024-12-20 NOTE — DISCHARGE NOTE NURSING/CASE MANAGEMENT/SOCIAL WORK - PATIENT PORTAL LINK FT
You can access the FollowMyHealth Patient Portal offered by [facility] by registering at the following website: [email or web address]. By joining [facility]’s FollowMyHealth portal, you will also be able to view your health information using other applications (apps) compatible with our system.

## 2024-12-20 NOTE — DISCHARGE NOTE NURSING/CASE MANAGEMENT/SOCIAL WORK - FINANCIAL ASSISTANCE
[facility] provides services at a reduced cost to those who are determined to be eligible through [facility]’s financial assistance program. Information regarding [facility]’s financial assistance program can be found by going to [email or web address] or by calling [phone].

## 2024-12-20 NOTE — ASU PATIENT PROFILE, ADULT - FALL HARM RISK - HARM RISK INTERVENTIONS

## 2024-12-20 NOTE — PROGRESS NOTE ADULT - SUBJECTIVE AND OBJECTIVE BOX
Patient seen today in bed. Figure 8 sutures removed from right and left groin without complication. No overnight events. Magnesium supplemented.     EKG: SR at 54bpm; QRSD 100ms; [id number]  TELE: SR. No events.     MEDICATIONS  (STANDING):  aMIOdarone    Tablet 200 milliGRAM(s) Oral daily  magnesium sulfate  IVPB 2 Gram(s) IV Intermittent once  rivaroxaban 20 milliGRAM(s) Oral daily  sodium chloride 0.9%. 1000 milliLiter(s) (50 mL/Hr) IV Continuous <Continuous>  spironolactone 25 milliGRAM(s) Oral daily    MEDICATIONS  (PRN):  acetaminophen     Tablet .. 650 milliGRAM(s) Oral every 6 hours PRN Mild Pain (1 - 3), Moderate Pain (4 - 6)  ALPRAZolam 0.25 milliGRAM(s) Oral every 6 hours PRN anxiety/insomnia  aluminum hydroxide/magnesium hydroxide/simethicone Suspension 30 milliLiter(s) Oral every 6 hours PRN Dyspepsia  benzocaine/menthol Lozenge 1 Lozenge Oral every 2 hours PRN Sore Throat  ondansetron Injectable 4 milliGRAM(s) IV Push every 6 hours PRN Nausea and/or Vomiting    Allergies  No Known Allergies    PAST MEDICAL & SURGICAL HISTORY:  Paroxysmal atrial fibrillation  RON (obstructive sleep apnea)  GERD (gastroesophageal reflux disease)  History of TIAs  H/O  section    Vital Signs Last 24 Hrs  T(C): 36.6 (20 Dec 2024 05:50), Max: 36.7 (20 Dec 2024 00:21)  T(F): 97.9 (20 Dec 2024 05:50), Max: 98 (20 Dec 2024 00:21)  HR: 60 (20 Dec 2024 05:50) (45 - 86)  BP: 104/58 (20 Dec 2024 05:50) (96/58 - 146/89)  BP(mean): 76 (19 Dec 2024 22:30) (76 - 108)  RR: 18 (20 Dec 2024 05:50) (13 - 18)  SpO2: 97% (20 Dec 2024 05:50) (97% - 100%)    Parameters below as of 20 Dec 2024 05:50  Patient On (Oxygen Delivery Method): room air    Physical Exam:  Constitutional: NAD, AAOx3  Cardiovascular: +S1S2 RRR  Pulmonary: CTA b/l, unlabored  GI: soft NTND +BS  Extremities: no pedal edema,   Right and left groin: No hematoma or ecchymosis appreciated.   Neuro: non focal, GUERRERO x4    LABS:                        10.7   10.09 )-----------( 192      ( 20 Dec 2024 05:30 )             32.5     140  |  109[H]  |  29.1[H]  ----------------------------<  106[H]  4.8   |  15.0[L]  |  1.08  Ca    8.1[L]      20 Dec 2024 05:30  Mg     1.7     12-20  PT/INR - ( 19 Dec 2024 10:10 )   PT: 20.0 sec;   INR: 1.73 ratio    PTT - ( 19 Dec 2024 10:10 )  PTT:36.4 sec    A/P  74-year-old woman with mild RON, TIA, GERD, PAF s/p prior EDWIN guided DCCV x 4 (most recent 2024) who developed recurrent atrial fibrillation despite Amiodarone initiation, and planned for AF ablation on 2024 but procedure aborted due to Smoke visualized in the JULIA via ICE catheter with access for ICE cathete.  Was continued on Amiodarone and uninterrupted Xarelto 20mg daily    She presented elective and underwent EDWIN which was notable for smoke but no JULIA thrombus. Now POD#1 s/p ablation of AFib and Aflutter (PFA of PVI, Anterior Mitral line; RFA of CTI)     - Continue Xarelto  - Amiodarone reduced.   - Off beta blockers  - Discharge home today after magnesium supplementation.

## 2024-12-30 ENCOUNTER — NON-APPOINTMENT (OUTPATIENT)
Age: 74
End: 2024-12-30

## 2025-01-03 ENCOUNTER — NON-APPOINTMENT (OUTPATIENT)
Age: 75
End: 2025-01-03

## 2025-01-03 ENCOUNTER — APPOINTMENT (OUTPATIENT)
Dept: ELECTROPHYSIOLOGY | Facility: CLINIC | Age: 75
End: 2025-01-03
Payer: MEDICARE

## 2025-01-03 VITALS
BODY MASS INDEX: 21.37 KG/M2 | OXYGEN SATURATION: 97 % | HEART RATE: 65 BPM | SYSTOLIC BLOOD PRESSURE: 142 MMHG | DIASTOLIC BLOOD PRESSURE: 74 MMHG | WEIGHT: 106 LBS | HEIGHT: 59 IN

## 2025-01-03 DIAGNOSIS — Z98.890 OTHER SPECIFIED POSTPROCEDURAL STATES: ICD-10-CM

## 2025-01-03 DIAGNOSIS — G45.9 TRANSIENT CEREBRAL ISCHEMIC ATTACK, UNSPECIFIED: ICD-10-CM

## 2025-01-03 DIAGNOSIS — I10 ESSENTIAL (PRIMARY) HYPERTENSION: ICD-10-CM

## 2025-01-03 DIAGNOSIS — Z86.79 OTHER SPECIFIED POSTPROCEDURAL STATES: ICD-10-CM

## 2025-01-03 PROCEDURE — 93000 ELECTROCARDIOGRAM COMPLETE: CPT

## 2025-01-03 PROCEDURE — 99214 OFFICE O/P EST MOD 30 MIN: CPT | Mod: 25

## 2025-01-07 ENCOUNTER — APPOINTMENT (OUTPATIENT)
Dept: CARDIOLOGY | Facility: CLINIC | Age: 75
End: 2025-01-07
Payer: MEDICARE

## 2025-01-07 VITALS
HEART RATE: 65 BPM | WEIGHT: 108 LBS | SYSTOLIC BLOOD PRESSURE: 140 MMHG | BODY MASS INDEX: 21.81 KG/M2 | OXYGEN SATURATION: 99 % | DIASTOLIC BLOOD PRESSURE: 90 MMHG

## 2025-01-07 DIAGNOSIS — G47.33 OBSTRUCTIVE SLEEP APNEA (ADULT) (PEDIATRIC): ICD-10-CM

## 2025-01-07 DIAGNOSIS — E78.5 HYPERLIPIDEMIA, UNSPECIFIED: ICD-10-CM

## 2025-01-07 DIAGNOSIS — M81.0 AGE-RELATED OSTEOPOROSIS W/OUT CURRENT PATHOLOGICAL FRACTURE: ICD-10-CM

## 2025-01-07 DIAGNOSIS — I48.0 PAROXYSMAL ATRIAL FIBRILLATION: ICD-10-CM

## 2025-01-07 DIAGNOSIS — K21.9 GASTRO-ESOPHAGEAL REFLUX DISEASE W/OUT ESOPHAGITIS: ICD-10-CM

## 2025-01-07 DIAGNOSIS — R06.02 SHORTNESS OF BREATH: ICD-10-CM

## 2025-01-07 PROCEDURE — 99214 OFFICE O/P EST MOD 30 MIN: CPT

## 2025-01-07 PROCEDURE — G2211 COMPLEX E/M VISIT ADD ON: CPT

## 2025-01-08 PROBLEM — Z98.890 S/P ABLATION OF ATRIAL FIBRILLATION: Status: ACTIVE | Noted: 2025-01-08

## 2025-01-09 RX ORDER — AMLODIPINE BESYLATE 10 MG/1
10 TABLET ORAL DAILY
Qty: 90 | Refills: 3 | Status: ACTIVE | COMMUNITY
Start: 2025-01-07 | End: 1900-01-01

## 2025-01-23 ENCOUNTER — NON-APPOINTMENT (OUTPATIENT)
Age: 75
End: 2025-01-23

## 2025-01-23 ENCOUNTER — APPOINTMENT (OUTPATIENT)
Dept: OPHTHALMOLOGY | Facility: CLINIC | Age: 75
End: 2025-01-23
Payer: MEDICARE

## 2025-01-23 PROCEDURE — 92014 COMPRE OPH EXAM EST PT 1/>: CPT

## 2025-01-23 PROCEDURE — 92020 GONIOSCOPY: CPT

## 2025-02-05 ENCOUNTER — LABORATORY RESULT (OUTPATIENT)
Age: 75
End: 2025-02-05

## 2025-02-18 ENCOUNTER — NON-APPOINTMENT (OUTPATIENT)
Age: 75
End: 2025-02-18

## 2025-02-18 ENCOUNTER — APPOINTMENT (OUTPATIENT)
Dept: CARDIOLOGY | Facility: CLINIC | Age: 75
End: 2025-02-18
Payer: MEDICARE

## 2025-02-18 VITALS
DIASTOLIC BLOOD PRESSURE: 60 MMHG | HEART RATE: 67 BPM | SYSTOLIC BLOOD PRESSURE: 120 MMHG | BODY MASS INDEX: 21.61 KG/M2 | OXYGEN SATURATION: 99 % | WEIGHT: 107 LBS

## 2025-02-18 DIAGNOSIS — R06.02 SHORTNESS OF BREATH: ICD-10-CM

## 2025-02-18 DIAGNOSIS — E78.5 HYPERLIPIDEMIA, UNSPECIFIED: ICD-10-CM

## 2025-02-18 DIAGNOSIS — G47.33 OBSTRUCTIVE SLEEP APNEA (ADULT) (PEDIATRIC): ICD-10-CM

## 2025-02-18 DIAGNOSIS — Z86.79 OTHER SPECIFIED POSTPROCEDURAL STATES: ICD-10-CM

## 2025-02-18 DIAGNOSIS — G45.9 TRANSIENT CEREBRAL ISCHEMIC ATTACK, UNSPECIFIED: ICD-10-CM

## 2025-02-18 DIAGNOSIS — M81.0 AGE-RELATED OSTEOPOROSIS W/OUT CURRENT PATHOLOGICAL FRACTURE: ICD-10-CM

## 2025-02-18 DIAGNOSIS — I48.0 PAROXYSMAL ATRIAL FIBRILLATION: ICD-10-CM

## 2025-02-18 DIAGNOSIS — K21.9 GASTRO-ESOPHAGEAL REFLUX DISEASE W/OUT ESOPHAGITIS: ICD-10-CM

## 2025-02-18 DIAGNOSIS — I10 ESSENTIAL (PRIMARY) HYPERTENSION: ICD-10-CM

## 2025-02-18 DIAGNOSIS — Z98.890 OTHER SPECIFIED POSTPROCEDURAL STATES: ICD-10-CM

## 2025-02-18 PROCEDURE — 99214 OFFICE O/P EST MOD 30 MIN: CPT

## 2025-02-18 PROCEDURE — 93000 ELECTROCARDIOGRAM COMPLETE: CPT

## 2025-02-18 PROCEDURE — G2211 COMPLEX E/M VISIT ADD ON: CPT

## 2025-04-02 PROBLEM — R06.02 SHORTNESS OF BREATH ON EXERTION: Status: RESOLVED | Noted: 2018-03-01 | Resolved: 2025-04-02

## 2025-04-10 ENCOUNTER — APPOINTMENT (OUTPATIENT)
Dept: OPHTHALMOLOGY | Facility: CLINIC | Age: 75
End: 2025-04-10
Payer: MEDICARE

## 2025-04-10 ENCOUNTER — NON-APPOINTMENT (OUTPATIENT)
Age: 75
End: 2025-04-10

## 2025-04-10 PROCEDURE — 92014 COMPRE OPH EXAM EST PT 1/>: CPT

## 2025-04-10 PROCEDURE — 92136 OPHTHALMIC BIOMETRY: CPT

## 2025-04-15 ENCOUNTER — INPATIENT (INPATIENT)
Facility: HOSPITAL | Age: 75
LOS: 1 days | Discharge: HOME | DRG: 86 | End: 2025-04-17
Attending: STUDENT IN AN ORGANIZED HEALTH CARE EDUCATION/TRAINING PROGRAM | Admitting: STUDENT IN AN ORGANIZED HEALTH CARE EDUCATION/TRAINING PROGRAM
Payer: MEDICARE

## 2025-04-15 VITALS
SYSTOLIC BLOOD PRESSURE: 107 MMHG | TEMPERATURE: 98 F | HEIGHT: 60 IN | WEIGHT: 149.91 LBS | RESPIRATION RATE: 16 BRPM | HEART RATE: 64 BPM | DIASTOLIC BLOOD PRESSURE: 63 MMHG | OXYGEN SATURATION: 93 %

## 2025-04-15 DIAGNOSIS — S06.5XAA TRAUMATIC SUBDURAL HEMORRHAGE WITH LOSS OF CONSCIOUSNESS STATUS UNKNOWN, INITIAL ENCOUNTER: ICD-10-CM

## 2025-04-15 DIAGNOSIS — Z98.891 HISTORY OF UTERINE SCAR FROM PREVIOUS SURGERY: Chronic | ICD-10-CM

## 2025-04-15 LAB
ANION GAP SERPL CALC-SCNC: 14 MMOL/L — SIGNIFICANT CHANGE UP (ref 5–17)
APTT BLD: 38.4 SEC — HIGH (ref 24.5–35.6)
BLD GP AB SCN SERPL QL: SIGNIFICANT CHANGE UP
BUN SERPL-MCNC: 15.6 MG/DL — SIGNIFICANT CHANGE UP (ref 8–20)
CALCIUM SERPL-MCNC: 9.5 MG/DL — SIGNIFICANT CHANGE UP (ref 8.4–10.5)
CHLORIDE SERPL-SCNC: 98 MMOL/L — SIGNIFICANT CHANGE UP (ref 96–108)
CO2 SERPL-SCNC: 25 MMOL/L — SIGNIFICANT CHANGE UP (ref 22–29)
CREAT SERPL-MCNC: 1.11 MG/DL — SIGNIFICANT CHANGE UP (ref 0.5–1.3)
EGFR: 52 ML/MIN/1.73M2 — LOW
EGFR: 52 ML/MIN/1.73M2 — LOW
GLUCOSE SERPL-MCNC: 91 MG/DL — SIGNIFICANT CHANGE UP (ref 70–99)
HCT VFR BLD CALC: 32 % — LOW (ref 34.5–45)
HGB BLD-MCNC: 10.8 G/DL — LOW (ref 11.5–15.5)
INR BLD: 3.23 RATIO — HIGH (ref 0.85–1.16)
MAGNESIUM SERPL-MCNC: 2 MG/DL — SIGNIFICANT CHANGE UP (ref 1.6–2.6)
MCHC RBC-ENTMCNC: 30.3 PG — SIGNIFICANT CHANGE UP (ref 27–34)
MCHC RBC-ENTMCNC: 33.8 G/DL — SIGNIFICANT CHANGE UP (ref 32–36)
MCV RBC AUTO: 89.9 FL — SIGNIFICANT CHANGE UP (ref 80–100)
NRBC # BLD AUTO: 0 K/UL — SIGNIFICANT CHANGE UP (ref 0–0)
NRBC # FLD: 0 K/UL — SIGNIFICANT CHANGE UP (ref 0–0)
NRBC BLD AUTO-RTO: 0 /100 WBCS — SIGNIFICANT CHANGE UP (ref 0–0)
PHOSPHATE SERPL-MCNC: 3.5 MG/DL — SIGNIFICANT CHANGE UP (ref 2.4–4.7)
PLATELET # BLD AUTO: 213 K/UL — SIGNIFICANT CHANGE UP (ref 150–400)
PMV BLD: 10.9 FL — SIGNIFICANT CHANGE UP (ref 7–13)
POTASSIUM SERPL-MCNC: 4.3 MMOL/L — SIGNIFICANT CHANGE UP (ref 3.5–5.3)
POTASSIUM SERPL-SCNC: 4.3 MMOL/L — SIGNIFICANT CHANGE UP (ref 3.5–5.3)
PROTHROM AB SERPL-ACNC: 37 SEC — HIGH (ref 9.9–13.4)
RBC # BLD: 3.56 M/UL — LOW (ref 3.8–5.2)
RBC # FLD: 13.3 % — SIGNIFICANT CHANGE UP (ref 10.3–14.5)
SODIUM SERPL-SCNC: 137 MMOL/L — SIGNIFICANT CHANGE UP (ref 135–145)
WBC # BLD: 7.3 K/UL — SIGNIFICANT CHANGE UP (ref 3.8–10.5)
WBC # FLD AUTO: 7.3 K/UL — SIGNIFICANT CHANGE UP (ref 3.8–10.5)

## 2025-04-15 PROCEDURE — 99223 1ST HOSP IP/OBS HIGH 75: CPT

## 2025-04-15 PROCEDURE — 99222 1ST HOSP IP/OBS MODERATE 55: CPT

## 2025-04-15 PROCEDURE — 93010 ELECTROCARDIOGRAM REPORT: CPT

## 2025-04-15 PROCEDURE — 99291 CRITICAL CARE FIRST HOUR: CPT

## 2025-04-15 PROCEDURE — 70450 CT HEAD/BRAIN W/O DYE: CPT | Mod: 26

## 2025-04-15 RX ORDER — ACETAMINOPHEN 500 MG/5ML
1000 LIQUID (ML) ORAL ONCE
Refills: 0 | Status: COMPLETED | OUTPATIENT
Start: 2025-04-15 | End: 2025-04-15

## 2025-04-15 RX ORDER — PROTHROMBIN COMPLEX CONCENTRATE (HUMAN) 25.5; 16.5; 24; 22; 22; 26 [IU]/ML; [IU]/ML; [IU]/ML; [IU]/ML; [IU]/ML; [IU]/ML
3500 POWDER, FOR SOLUTION INTRAVENOUS ONCE
Refills: 0 | Status: COMPLETED | OUTPATIENT
Start: 2025-04-15 | End: 2025-04-15

## 2025-04-15 RX ORDER — PROTHROMBIN COMPLEX CONCENTRATE (HUMAN) 25.5; 16.5; 24; 22; 22; 26 [IU]/ML; [IU]/ML; [IU]/ML; [IU]/ML; [IU]/ML; [IU]/ML
1500 POWDER, FOR SOLUTION INTRAVENOUS ONCE
Refills: 0 | Status: DISCONTINUED | OUTPATIENT
Start: 2025-04-15 | End: 2025-04-15

## 2025-04-15 RX ADMIN — PROTHROMBIN COMPLEX CONCENTRATE (HUMAN) 400 INTERNATIONAL UNIT(S): 25.5; 16.5; 24; 22; 22; 26 POWDER, FOR SOLUTION INTRAVENOUS at 22:12

## 2025-04-15 RX ADMIN — Medication 75 MILLILITER(S): at 20:21

## 2025-04-15 RX ADMIN — Medication 400 MILLIGRAM(S): at 20:20

## 2025-04-15 RX ADMIN — Medication 102 MILLIGRAM(S): at 20:55

## 2025-04-15 NOTE — ED PROVIDER NOTE - CLINICAL SUMMARY MEDICAL DECISION MAKING FREE TEXT BOX
75 y/o F presents [place] [facility] [place] found after she fell down a few stairs 2 days ago. She is on Xarelto, last dose this morning. Denies headache, neuro intact. Trauma scanned at [facility] [place] without other injuries. NSx evaluated patient, requesting q1h neuro checks. I discussed with trauma who will see patient.

## 2025-04-15 NOTE — H&P ADULT - HISTORY OF PRESENT ILLNESS
HPI: " 75 yo female with a pmhx of HTN, A. Fib, Stroke on Xarelto last taken was today, PSH of  who reports a fall 2 days ago. Patient states that she was tying her soon and fell backwards. (-) LOC, (+) Head strike. Patient states that she presented to the ED today because of a black and blue on her back. She denies any headaches, vision changes, weakness in her arms or legs or any other complaints. "       ROS: 10-system review is otherwise negative except HPI above.      PAST MEDICAL & SURGICAL HISTORY:  Paroxysmal atrial fibrillation      RON (obstructive sleep apnea)      GERD (gastroesophageal reflux disease)      History of TIAs      H/O  section        FAMILY HISTORY:  No pertinent family history in first degree relatives      Family history not pertinent as reviewed with the patient.    SOCIAL HISTORY:  Denies any toxic habits    ALLERGIES: NKA No Known Allergies      HOME MEDICATIONS:   Home Medications:  amiodarone 200 mg oral tablet: 1 tab(s) orally once a day (20 Dec 2024 08:50)  levocetirizine 5 mg oral tablet: 1 tab(s) orally once a day (19 Dec 2024 10:17)  spironolactone 25 mg oral tablet: 1 tab(s) orally once a day (19 Dec 2024 21:20)  Xarelto 20 mg oral tablet: 1 tab(s) orally once a day (19 Dec 2024 21:20)      --------------------------------------------------------------------------------------------    PHYSICAL EXAM:   General: NAD, Lying in bed comfortably  Neuro: A+Ox3, forehead bruises  HEENT: EOMI, PERRLA, MMM  Cardio: RRR  Resp: Non labored breathing on RA  GI/Abd: Soft, NT/ND, no rebound/guarding, no masses palpated  Vascular: All 4 extremities warm and well perfused.   Pelvis: stable  Musculoskeletal: All 4 extremities moving spontaneously, no limitations, no spinal tenderness. L flank ecchymosis  --------------------------------------------------------------------------------------------    LABS                 10.8   7.30   )----------(  213       ( 15 Apr 2025 17:12 )               32.0      137    |  98     |  15.6   ----------------------------<  91         ( 15 Apr 2025 17:12 )  4.3     |  25.0   |  1.11     Ca    9.5        ( 15 Apr 2025 17:12 )  Phos  3.5       ( 15 Apr 2025 17:12 )  Mg     2.0       ( 15 Apr 2025 17:12 )            CAPILLARY BLOOD GLUCOSE        Urinalysis Basic - ( 15 Apr 2025 17:12 )    Color: x / Appearance: x / SG: x / pH: x  Gluc: 91 mg/dL / Ketone: x  / Bili: x / Urobili: x   Blood: x / Protein: x / Nitrite: x   Leuk Esterase: x / RBC: x / WBC x   Sq Epi: x / Non Sq Epi: x / Bacteria: x          --------------------------------------------------------------------------------------------

## 2025-04-15 NOTE — ED PROVIDER NOTE - OBJECTIVE STATEMENT
74-year-old female with past medical history of paroxysmal A-fib on Xarelto, RON, TIA presents as transfer from [place] after a fall down a few steps 2 days ago, landing on her back.  She went to because she had bruising on her back, she had trauma scans which revealed a small subdural hematoma.  Patient denies headache, dizziness, nausea, focal weakness or numbness, vision changes.  She was transferred here for neurosurgical evaluation.  She has no complaints.  She took Xarelto this morning.  Neurosurgery evaluated patient and requesting trauma evaluation as patient will require every hour neurochecks.

## 2025-04-15 NOTE — ED ADULT NURSE NOTE - OBJECTIVE STATEMENT
Assumed care of pt at 1618 in CC. Pt A&Ox4 c/o [place] [facility] [place]. Pt fell approx 2 days ago and is on xarelto. Pt denies CP and SOB RR even and unlabored CM initiated and maintained Repeat CT done as per  orders, neur o q1 maintained

## 2025-04-15 NOTE — ED PROVIDER NOTE - NSICDXFAMILYHX_GEN_ALL_CORE_FT
SEE HPI    All other ROS negative unless otherwise specified in HPI.
FAMILY HISTORY:  No pertinent family history in first degree relatives

## 2025-04-15 NOTE — H&P ADULT - ASSESSMENT
ASSESSMENT: 73 yo female with a pmhx of LILLIAM Gilman on Xarelto presents after having a fall 2 days found to have a a small falcine SDH       PLAN:    - Admit to ICU   - f/u Neurosx recs   - f/u R CT head   - pain control  - tertiary   - OOB/ambulate  - strict I/Os  - Plan discussed with Attending, Dr. Nieves

## 2025-04-15 NOTE — H&P ADULT - ATTENDING COMMENTS
74-year-old female admitted on 4/15/25 after delayed presentation from a fall two days ago, now found to have a small falcine subdural hematoma. History notable for paroxysmal atrial fibrillation on Xarelto, hypertension, GERD, RON, and prior TIAs. No LOC reported. Presented due to visible bruising on back.   - Patient alert and oriented. No focal deficits. Mild L flank ecchymosis noted without spinal tenderness. Neurologically nonfocal, hemodynamically stable      #Traumatic Subdural Hematoma, TBI,  Mechanical fall: Hold Xarelto. Neurosurgery following. Repeat CT head with slight progression.   - AC reversal and repeat CT head in 6 hours.   - Q1 hr neurochecks  - Geriatric trauma consult    .  #Coagulopathy: Xarelto held due to head bleed. Monitor INR and CBC   - vitamin K and PCC      #Hypertension, Afib, and HLD: Continue home amiodarone and spironolactone. Monitor BP.   Hold AC     #Acute Blood Loss Anemia:  trend and transfuse for HGB <7      #DVT Prophylaxis: Sequential compression devices. Chemoprophylaxis held due to bleed.    #Incidental findings: complete TSS and discuss findings with patient. Include in discharge narrative.   #Code Status: Full Code   #Discharge Planning: PT/OT to evaluate mobility and safety. Geriatric consult pending.

## 2025-04-15 NOTE — ED ADULT NURSE NOTE - NSFALLHARMRISKINTERV_ED_ALL_ED

## 2025-04-15 NOTE — ED PROVIDER NOTE - NSTIMEPROVIDERCAREINITIATE_GEN_ER
15-Apr-2025 16:29
pt BIBA minimally responsive in respiratory distress, SOB x few days. pt hypoxic on NRB on arrival, skin warm to touch.  placed in critical care, RT paged and ER MD @ bedside.

## 2025-04-15 NOTE — ED PROVIDER NOTE - CRITICAL CARE ATTENDING CONTRIBUTION TO CARE
Jayne SMITH DO, have personally provided 35 minutes of critical care time exclusive of time spent on separately billable procedures. Time includes review of laboratory data, radiology results, discussion with consultants, and monitoring for potential decompensation. Interventions were performed as documented above.

## 2025-04-15 NOTE — ED PROVIDER NOTE - PHYSICAL EXAMINATION
Const: Awake, alert and oriented. In no acute distress. Well appearing.  HEENT: NC/AT. No raccoon eyes, no chavarria sign, no epistaxis, no septal hematoma, no intraoral lacerations or bleeding, no tongue lacerations or abrasions.  Eyes: No scleral icterus. EOMI.  Neck:. No midline TTP. No palpable step offs.  Chest: Chest wall stable, no flail chest, no crepitus on palpation.  Cardiac: Regular rate and regular rhythm. +S1/S2. No murmurs. 2+ Central pulses and symmetric. Peripheral pulses 2+ and symmetric. No LE edema.  Resp: Speaking in full sentences. No evidence of respiratory distress. No wheezes, rales or rhonchi.  Abd: Soft, non-tender, non-distended. Normal bowel sounds in all 4 quadrants. No guarding or rebound.  MSK: Pelvis stable. No obvious deformity.  Back: Spine midline and non-tender. No palpable step offs.  Skin: Ecchymosis to left flank and right upper back. No rashes, abrasions or lacerations.  Neuro: Awake, alert & oriented x 3. GCS 15. Withdraws to pain symmetrically. Follows commands. 5/5 strength symmetrically all extremities.

## 2025-04-15 NOTE — GOALS OF CARE CONVERSATION - ADVANCED CARE PLANNING - CONVERSATION DETAILS
Edita York is a 74yF, admitted to the SICU with SDH.    I spoke with Edita at her bedside, and her , Kilo, was identified as the health care proxy, and we had an extensive conversation aboutEdita 's care and current condition.     Discussed overall goals of care including advanced directives with Edita. During this discussion we reviewed the current diagnosis, treatment plan, and likely prognosis. An explanation of advanced directives and MOLST was provided.     After this conversation decision was made by Edita to continue FULL CODE status.     Above was reviewed with SICU attending physician Dr. Nieves .     16 minutes spent advanced care planning.     Diagnosis:  - SDH  - Coagulopathy  - Mild TBI

## 2025-04-15 NOTE — ED ADULT TRIAGE NOTE - CHIEF COMPLAINT QUOTE
Pelvis & hip films reviewed. Implants are in appropriate position. No fracture or dislocation noted. Patient is WBAT of the surgical extremity.   Pt transferred from [facility] [place] for subdural brain bleed.

## 2025-04-15 NOTE — CONSULT NOTE ADULT - SUBJECTIVE AND OBJECTIVE BOX
Patient is a 74y old  Female who presents with a chief complaint of a fall    HISTORY OF PRESENT ILLNESS:   73 yo female with a pmhx of HTN, A. Fib, Stroke on Xarelto, PSH of  who reports a fall 2 days ago. Patient states that she was tying her soon and fell backwards. (-) LOC, (+) Headstrike. Patient states that she presented to the ED today because of a black and blue on her back. She denies any headaches, vision changes, weakness in her arms or legs or any other complaints.     PAST MEDICAL & SURGICAL HISTORY:  Paroxysmal atrial fibrillation  RON (obstructive sleep apnea)  GERD (gastroesophageal reflux disease)  History of TIAs  H/O  section    FAMILY HISTORY:  No pertinent family history in first degree relatives    Allergies  No Known Allergies    REVIEW OF SYSTEMS  Negative except as noted in HPI    HOME MEDICATIONS:  Home Medications:  amiodarone 200 mg oral tablet: 1 tab(s) orally once a day (20 Dec 2024 08:50)  levocetirizine 5 mg oral tablet: 1 tab(s) orally once a day (19 Dec 2024 10:17)  spironolactone 25 mg oral tablet: 1 tab(s) orally once a day (19 Dec 2024 21:20)  Xarelto 20 mg oral tablet: 1 tab(s) orally once a day (19 Dec 2024 21:20)    Vital Signs Last 24 Hrs  T(C): 36.6 (15 Apr 2025 15:41), Max: 36.6 (15 Apr 2025 15:41)  T(F): 97.9 (15 Apr 2025 15:41), Max: 97.9 (15 Apr 2025 15:41)  HR: 64 (15 Apr 2025 15:41) (64 - 64)  BP: 107/63 (15 Apr 2025 15:41) (107/63 - 107/63)  BP(mean): --  RR: 16 (15 Apr 2025 15:41) (16 - 16)  SpO2: 93% (15 Apr 2025 15:41) (93% - 93%)    Parameters below as of 15 Apr 2025 15:41  Patient On (Oxygen Delivery Method): room air    PHYSICAL EXAM:  GENERAL: NAD, well-groomed, well-developed  HEAD:  Atraumatic, normocephalic  NECK: Supple, Non tender  GAMA COMA SCORE: E- V- M- = 15  MENTAL STATUS: AAO x3; Awake; Opens eyes spontaneously, Appropriately conversant without aphasia, following simple commands  CRANIAL NERVES: PERRL. EOMI without nystagmus. Mild left nasolabial fold flattening. Hearing grossly intact. Speech clear. Head turning and shoulder shrug intact.   REFLEXES: PERRL. Corneals intact b/l. Gag intact. Cough intact. Oculocephalic reflex intact (Doll's eye). Negative Lama's b/l. Negative clonus b/l  MOTOR: strength 5/5 b/l upper and lower extremities, no drift  SENSATION: grossly intact to light touch all extremities  SKIN: Warm, dry; no rashes or lesions    RADIOLOGY & ADDITIONAL STUDIES:  [place] [facility] [place] Small Subdural hematomas along the falx, anterior aspect of the right frontal lobe, and likely the right leaf of the tentorium measuring 3mm, CT C-Spine: Negative    CAPRINI SCORE [CLOT]:  Patient has an estimated Caprini score of greater than 5.  However, the patient's unique clinical situation will be addressed in an individual manner to determine appropriate anticoagulation treatment, if any. Patient is a 74y old  Female who presents with a chief complaint of a fall    HISTORY OF PRESENT ILLNESS:   75 yo female with a pmhx of HTN, A. Fib, Stroke on Xarelto, PSH of  who reports a fall 2 days ago. Patient states that she was tying her soon and fell backwards. (-) LOC, (+) Head strike. Patient states that she presented to the ED today because of a black and blue on her back. She denies any headaches, vision changes, weakness in her arms or legs or any other complaints.     PAST MEDICAL & SURGICAL HISTORY:  Paroxysmal atrial fibrillation  RON (obstructive sleep apnea)  GERD (gastroesophageal reflux disease)  History of TIAs  H/O  section    FAMILY HISTORY:  No pertinent family history in first degree relatives    Allergies  No Known Allergies    REVIEW OF SYSTEMS  Negative except as noted in HPI    HOME MEDICATIONS:  Home Medications:  amiodarone 200 mg oral tablet: 1 tab(s) orally once a day (20 Dec 2024 08:50)  levocetirizine 5 mg oral tablet: 1 tab(s) orally once a day (19 Dec 2024 10:17)  spironolactone 25 mg oral tablet: 1 tab(s) orally once a day (19 Dec 2024 21:20)  Xarelto 20 mg oral tablet: 1 tab(s) orally once a day (19 Dec 2024 21:20)    Vital Signs Last 24 Hrs  T(C): 36.6 (15 Apr 2025 15:41), Max: 36.6 (15 Apr 2025 15:41)  T(F): 97.9 (15 Apr 2025 15:41), Max: 97.9 (15 Apr 2025 15:41)  HR: 64 (15 Apr 2025 15:41) (64 - 64)  BP: 107/63 (15 Apr 2025 15:41) (107/63 - 107/63)  BP(mean): --  RR: 16 (15 Apr 2025 15:41) (16 - 16)  SpO2: 93% (15 Apr 2025 15:41) (93% - 93%)    Parameters below as of 15 Apr 2025 15:41  Patient On (Oxygen Delivery Method): room air    PHYSICAL EXAM:  GENERAL: NAD, well-groomed, well-developed  HEAD:  Atraumatic, normocephalic  NECK: Supple, Non tender  GAMA COMA SCORE: E- V- M- = 15  MENTAL STATUS: AAO x3; Awake; Opens eyes spontaneously, Appropriately conversant without aphasia, following simple commands  CRANIAL NERVES: PERRL. EOMI without nystagmus. Mild left nasolabial fold flattening. Hearing grossly intact. Speech clear. Head turning and shoulder shrug intact.   REFLEXES: PERRLA  MOTOR: strength 5/5 b/l upper and lower extremities, no drift  SENSATION: grossly intact to light touch all extremities  SKIN: Warm    RADIOLOGY & ADDITIONAL STUDIES:  [place] 12[place] [facility] [place]: Small Subdural hematomas along the falx, anterior aspect of the right frontal lobe, and likely the right leaf of the tentorium measuring 3mm, CT C-Spine: Negative    CAPRINI SCORE [CLOT]:  Patient has an estimated Caprini score of greater than 5.  However, the patient's unique clinical situation will be addressed in an individual manner to determine appropriate anticoagulation treatment, if any.

## 2025-04-15 NOTE — CONSULT NOTE ADULT - ASSESSMENT
75 yo female with a pmhx of LILLIAM Gilman, HTH, previous stroke who presents after having a fall 2 days (+) head strike, (-) LOC who was found to have a a small falcine SDH     Plan:  -Case and plan discussed with Dr. Vega  - Trauma consult  - q1 hour neurochecks  -  73 yo female with a pmhx of A. Fib, HTH, previous stroke who presents after having a fall 2 days (+) head strike, (-) LOC who was found to have a a small falcine SDH     Plan:  -Case and plan discussed with Dr. Vega  - Trauma consult  - q1 hour neuro checks  - Rpt CTH in 6 hours and 24hours, can be done sooner if clinically indicated  -  Hold Home Xarelto until cleared by NSG  - DVT ppx: SCD's, hold pharmacological DVT ppx until cleared by NSG  - Goal SBP < 160   75 yo female with a pmhx of A. Fib, HTH, previous stroke who presents after having a fall 2 days (+) head strike, (-) LOC who was found to have a a small falcine SDH     Plan:  - Case and plan discussed with Dr. Vega  - Trauma consult  - q1 hour neuro checks  - Rpt CTH in 6 hours and 24hours, can be done sooner if clinically indicated  - Hold Home Xarelto until cleared by NSG  - DVT ppx: SCD's, hold pharmacological DVT ppx until cleared by NSG  - Keppra  - Goal SBP < 160

## 2025-04-15 NOTE — ED ADULT NURSE NOTE - EXTENSIONS OF SELF_ADULT
SHANNAN ambulatory encounter  Gastroenterology      CHIEF COMPLAINT:  Rectal Problem (Has been taking PPI prn, bleeding noted last Wed none since feels he had and \"attack of diverticulitis\"  no pain no diarrhea.)     Primary care provider    None    History    Saulo Cardona is a 54 year old male who presents for consultation at the request of Dr. Huy Magallanes (emergency department) regarding lower GI bleeding. Patient is accompanied with his wife today.    Patient was seen in the emergency department on 4/3/19 for the above-mentioned complaint. CBC revealed a hemoglobin of 12.6 and hematocrit 30.4. Previous CBC checked 3 years ago which was unremarkable. He did report a similar issue in 2014 where he had a bleeding diverticulum, at that time his hemoglobin was 8.6. Patient does currently take meloxicam 15 mg daily for chronic hip pain.    Reports controlled GERD on omeprazole 40 mg daily. Currently reports intermittent left-sided abdominal \"ache\" before a bowel movement; improves after bowel movement. Currently denies nausea, vomiting, diarrhea, constipation, blood in the stool, or rectal bleeding.  Denies melena. States he has not had any rectal bleeding since he was seen in the emergency department on 4/3/19.  Has no problems with hemorrhoids.  Describes his stools as between a type 5-6 on the [place] stool form scale 3-4 bowel movements per day.  Patient states he will occasionally use over-the-counter magnesium citrate to  \"wash out the pockets\". Appetite is good without dysphagia.  Patient unsure of family history has he was adopted. Patient has had previous hernia repair, no other abdominal or pelvic surgeries. Patient does work third shift at a local factory.        Last colonoscopy with IV sedation (80 mg Demerol and 8 mg Versed) with preformed by Dr. Tj Barr on 11/7/14 for rectal bleeding.    ENDOSCOPIC DIAGNOSIS:  Diffuse moderate to severe diverticulosis.  There was no blood noted in the colon  at the time of this colonoscopy.  The resolution Hemoclips that were placed on one of the diverticula in proximal descending colon near the splenic flexure were still intact.  Despite significant effort, I am unable to find any other potential site of bleeding in this colon, but clinically it appears that patient had bleeding from one of the diverticula in the left colon.  A 3 mm small hyperplastic-appearing polyp was removed from distal transverse colon.  RECOMMENDATIONS:  1.  Follow H and H.  2.  In case patient were to experience recurrent bleeding, will consider a bleeding scan.  Pathologic Diagnosis :    Colon, transverse, biopsy:    - Colonic mucosa with focal active inflammation.    - No dysplasia or malignancy identified.      Last EGD with Mac anesthesia performed on 11/5/2014 by Dr. Tj Barr.  ENDOSCOPIC DIAGNOSES:  1.  Nonerosive reflux disease without evidence of Zaidi's esophagus.  2.  A 3 mm nodule noted in distal esophagus - removed with cold biopsy.  3.  Widely patent Schatzki ring with a diameter of 18 mm.  RECOMMENDATIONS:  1.  Follow up biopsy.  2.  Continue with Omeprazole.  3.  We will proceed with colonoscopy for further evaluation of patient's GI bleeding.          - Hx of Cerebrovascular Disease:No    - Hx of COPD/Lung disease:No    - Hx of Type II DM:No    - Hx of Renal insufficiency:No    Current anticoagulation therapy: No  ETOH misuse: Yes drinks 6 pack of beer per night.  Sleep apnea: Yes wears CPAP nightly.  Drug Use: Uses marijuana daily.    medical history    Past Medical History:   Diagnosis Date   • Anxiety    • Diverticulosis    • Humerus fracture     right   • Personal history of traumatic fracture     ankle   • Sleep apnea        SURGICAL history    Past Surgical History:   Procedure Laterality Date   • Colonoscopy  11/2014   • Colonoscopy diagnostic  11/2014    Colonoscopy, Dx repeat 11/2021   • Colonoscopy diagnostic  11/2014    Colonoscopy, Dx   •  Esophagogastroduodenoscopy transoral flex w/bx single or mult  11/2014    EGD with Bx   • Fracture surgery  10/2013   • Hernia repair     • Septostomy     • Tonsillectomy and adenoidectomy     • Upper gastrointestinal endoscopy  11/2014   • Vasectomy         social history    Social History     Tobacco Use   • Smoking status: Former Smoker     Years: 40.00     Types: Cigarettes   • Smokeless tobacco: Never Used   Substance Use Topics   • Alcohol use: Yes     Alcohol/week: 0.0 oz     Comment: 40-45 beers weekly   • Drug use: Yes     Types: Marijuana     Comment: Daily       family history    Family History   Adopted: Yes   Problem Relation Age of Onset   • Asthma Son         Exercise-induced asthma   • Patient is unaware of any medical problems Mother        mEDICATIONS    Current Outpatient Medications   Medication Sig   • acetaminophen (TYLENOL) 325 MG tablet Take 650 mg by mouth every 4 hours as needed for Pain.   • meloxicam (MOBIC) 15 MG tablet Take 1 tablet by mouth daily.   • citalopram (CELEXA) 20 MG tablet Take 1 tablet by mouth daily.   • lisinopril (PRINIVIL,ZESTRIL) 30 MG tablet Take 1 tablet by mouth daily.   • omeprazole (PRILOSEC) 40 MG capsule TAKE ONE CAPSULE BY MOUTH ONCE DAILY   • bisacodyl (DULCOLAX) 5 MG EC tablet 2 tabs as directed on colonoscopy prep sheets.   • electrolyte/PEG 3350, 4 flavor pkts, (GOLYTELY) 236 g suspension 1 gallon as directed on Colonoscopy Instructions     No current facility-administered medications for this visit.        aLLERGIES    ALLERGIES:   Allergen Reactions   • Nsaids Other (See Comments)     Severe GI bleed 11/2014   • Goat-Derived Products        I have reviewed with the patient the past medical, family and social history sections including the medications and allergies listed in the above medical record as well as the nursing notes.    Review of systems    CONSTITUTIONAL: Denies fever, chills and tiredness  RESPIRATORY: Denies cough, shortness of breath, or  wheezing.  CARDIOVASCULAR: Denies chest pain, edema, or palpitations.   INTEGUMENT: Denies rash and itching.  PSYCHOLOGICAL: History anxiety, taking citalopram.    Physical ExamINATION    Vital Signs:    Vitals:    04/08/19 1127 04/08/19 1135   BP: 164/78 150/80   Pulse: 72    Weight: 111.5 kg    Height: 5' 8\" (1.727 m)    Body mass index is 37.37 kg/m².    GENERAL:  The patient is alert and oriented times 3, in no acute distress.  Well nourished.       Head: Atraumatic, normocephalic.       Eyes: Conjunctivae are noninjected with no icterus, no discharge.  Pupils are equally round and reactive to light.  Extraocular muscles are intact.      Oropharynx:  Mucous membranes are moist, no erythema or edema, no exudate.  No aphthous ulcers or thrush is noted.  RESPIRATORY:  Respiratory effort is normal.  Lungs are clear to auscultation bilaterally with equal breath sounds.  CARDIOLOGIC:  Normal rate, regular rhythm.  No murmurs, rubs or gallops.    ABDOMEN:  Soft, nondistended, nontender.  Normal bowel sounds in all 4 quadrants.  No hepatosplenomegaly, no masses, no rebound or guarding.    SKIN:  Warm and dry.  No rashes.  PSYCHOLOGIC: Normal affect, mood, and behavior.      ASSESSMENT/PLAN:  Saulo was seen today for rectal problem.    Diagnoses and all orders for this visit:    Rectal bleeding  -     bisacodyl (DULCOLAX) 5 MG EC tablet; 2 tabs as directed on colonoscopy prep sheets.  -     electrolyte/PEG 3350, 4 flavor pkts, (GOLYTELY) 236 g suspension; 1 gallon as directed on Colonoscopy Instructions    History of colon polyps    History of diverticulosis    CAL on CPAP    Alcohol abuse, daily use    Marijuana use    NSAID long-term use      Evaluation is complete and patient will be scheduled for colonoscopy with Mac anesthesia. Risks and benefits of the procedure were discussed in detail. Discussed with patient possible complications which include but are not limited to bleeding, bloating, and perforation.  Provided verbal education on diverticulosis and diverticulitis. Advised patient to avoid magnesium citrate. Advised patient that if he feels he needs something for bowel regularity, he could consider using over-the-counter MiraLAX 17 g daily as needed. Patient states understanding of these instructions and agrees to proceed with plan.  All of patient's questions are answered to their satisfaction.      Patient instructed:  Do not take any over-the-counter/supplement medication or vitamins the morning of your procedure.  Please take your citalopram, lisinopril, and omeprazole the morning of your procedure.  No marijuana 24 hours prior to procedure as this interferes with the sedation.     Return if symptoms worsen or fail to improve.  Follow-up will be determined based on results of procedure at the direction of Dr. Barr.     See Patient Instructions section.  After visit summary given and reviewed with patient.                     None

## 2025-04-15 NOTE — ED PROVIDER NOTE - NS ED ROS FT
Const: Denies fever, chills  HEENT: Denies blurry vision, sore throat  Neck: Denies neck pain/stiffness  Resp: Denies coughing, SOB  Cardiovascular: Denies CP, palpitations, LE edema  GI: Denies nausea, vomiting, abdominal pain, diarrhea, constipation, blood in stool  : Denies urinary frequency/urgency/dysuria, hematuria  MSK: Denies back pain  Neuro: Denies HA, dizziness, numbness, weakness  Skin: + bruising. Denies rashes.

## 2025-04-16 LAB
ALBUMIN SERPL ELPH-MCNC: 3.5 G/DL — SIGNIFICANT CHANGE UP (ref 3.3–5.2)
ALP SERPL-CCNC: 72 U/L — SIGNIFICANT CHANGE UP (ref 40–120)
ALT FLD-CCNC: 32 U/L — SIGNIFICANT CHANGE UP
ANION GAP SERPL CALC-SCNC: 13 MMOL/L — SIGNIFICANT CHANGE UP (ref 5–17)
APPEARANCE UR: CLEAR — SIGNIFICANT CHANGE UP
APTT BLD: 36.9 SEC — HIGH (ref 24.5–35.6)
AST SERPL-CCNC: 35 U/L — HIGH
BASOPHILS # BLD AUTO: 0.02 K/UL — SIGNIFICANT CHANGE UP (ref 0–0.2)
BASOPHILS NFR BLD AUTO: 0.3 % — SIGNIFICANT CHANGE UP (ref 0–2)
BILIRUB DIRECT SERPL-MCNC: 0.1 MG/DL — SIGNIFICANT CHANGE UP (ref 0–0.3)
BILIRUB INDIRECT FLD-MCNC: 0.4 MG/DL — SIGNIFICANT CHANGE UP (ref 0.2–1)
BILIRUB SERPL-MCNC: 0.5 MG/DL — SIGNIFICANT CHANGE UP (ref 0.4–2)
BILIRUB UR-MCNC: NEGATIVE — SIGNIFICANT CHANGE UP
BUN SERPL-MCNC: 13.9 MG/DL — SIGNIFICANT CHANGE UP (ref 8–20)
CALCIUM SERPL-MCNC: 8.9 MG/DL — SIGNIFICANT CHANGE UP (ref 8.4–10.5)
CHLORIDE SERPL-SCNC: 102 MMOL/L — SIGNIFICANT CHANGE UP (ref 96–108)
CO2 SERPL-SCNC: 22 MMOL/L — SIGNIFICANT CHANGE UP (ref 22–29)
COLOR SPEC: YELLOW — SIGNIFICANT CHANGE UP
CREAT SERPL-MCNC: 1.04 MG/DL — SIGNIFICANT CHANGE UP (ref 0.5–1.3)
DIFF PNL FLD: NEGATIVE — SIGNIFICANT CHANGE UP
EGFR: 56 ML/MIN/1.73M2 — LOW
EGFR: 56 ML/MIN/1.73M2 — LOW
EOSINOPHIL # BLD AUTO: 0.05 K/UL — SIGNIFICANT CHANGE UP (ref 0–0.5)
EOSINOPHIL NFR BLD AUTO: 0.8 % — SIGNIFICANT CHANGE UP (ref 0–6)
GLUCOSE SERPL-MCNC: 83 MG/DL — SIGNIFICANT CHANGE UP (ref 70–99)
GLUCOSE UR QL: NEGATIVE MG/DL — SIGNIFICANT CHANGE UP
HCT VFR BLD CALC: 31.7 % — LOW (ref 34.5–45)
HGB BLD-MCNC: 10.5 G/DL — LOW (ref 11.5–15.5)
IMM GRANULOCYTES # BLD AUTO: 0.02 K/UL — SIGNIFICANT CHANGE UP (ref 0–0.07)
IMM GRANULOCYTES NFR BLD AUTO: 0.3 % — SIGNIFICANT CHANGE UP (ref 0–0.9)
INR BLD: 2.06 RATIO — HIGH (ref 0.85–1.16)
KETONES UR-MCNC: ABNORMAL MG/DL
LEUKOCYTE ESTERASE UR-ACNC: NEGATIVE — SIGNIFICANT CHANGE UP
LYMPHOCYTES # BLD AUTO: 2.19 K/UL — SIGNIFICANT CHANGE UP (ref 1–3.3)
LYMPHOCYTES NFR BLD AUTO: 32.9 % — SIGNIFICANT CHANGE UP (ref 13–44)
MAGNESIUM SERPL-MCNC: 1.9 MG/DL — SIGNIFICANT CHANGE UP (ref 1.8–2.6)
MCHC RBC-ENTMCNC: 29.8 PG — SIGNIFICANT CHANGE UP (ref 27–34)
MCHC RBC-ENTMCNC: 33.1 G/DL — SIGNIFICANT CHANGE UP (ref 32–36)
MCV RBC AUTO: 90.1 FL — SIGNIFICANT CHANGE UP (ref 80–100)
MONOCYTES # BLD AUTO: 0.76 K/UL — SIGNIFICANT CHANGE UP (ref 0–0.9)
MONOCYTES NFR BLD AUTO: 11.4 % — SIGNIFICANT CHANGE UP (ref 2–14)
MRSA PCR RESULT.: SIGNIFICANT CHANGE UP
NEUTROPHILS # BLD AUTO: 3.62 K/UL — SIGNIFICANT CHANGE UP (ref 1.8–7.4)
NEUTROPHILS NFR BLD AUTO: 54.3 % — SIGNIFICANT CHANGE UP (ref 43–77)
NITRITE UR-MCNC: NEGATIVE — SIGNIFICANT CHANGE UP
NRBC # BLD AUTO: 0 K/UL — SIGNIFICANT CHANGE UP (ref 0–0)
NRBC # FLD: 0 K/UL — SIGNIFICANT CHANGE UP (ref 0–0)
NRBC BLD AUTO-RTO: 0 /100 WBCS — SIGNIFICANT CHANGE UP (ref 0–0)
PH UR: 6.5 — SIGNIFICANT CHANGE UP (ref 5–8)
PHOSPHATE SERPL-MCNC: 3.5 MG/DL — SIGNIFICANT CHANGE UP (ref 2.4–4.7)
PLATELET # BLD AUTO: 194 K/UL — SIGNIFICANT CHANGE UP (ref 150–400)
PMV BLD: 10.8 FL — SIGNIFICANT CHANGE UP (ref 7–13)
POTASSIUM SERPL-MCNC: 4.1 MMOL/L — SIGNIFICANT CHANGE UP (ref 3.5–5.3)
POTASSIUM SERPL-SCNC: 4.1 MMOL/L — SIGNIFICANT CHANGE UP (ref 3.5–5.3)
PROT SERPL-MCNC: 6.2 G/DL — LOW (ref 6.6–8.7)
PROT UR-MCNC: SIGNIFICANT CHANGE UP MG/DL
PROTHROM AB SERPL-ACNC: 23.1 SEC — HIGH (ref 9.9–13.4)
RBC # BLD: 3.52 M/UL — LOW (ref 3.8–5.2)
RBC # FLD: 13.5 % — SIGNIFICANT CHANGE UP (ref 10.3–14.5)
S AUREUS DNA NOSE QL NAA+PROBE: SIGNIFICANT CHANGE UP
SODIUM SERPL-SCNC: 137 MMOL/L — SIGNIFICANT CHANGE UP (ref 135–145)
SP GR SPEC: >1.03 — HIGH (ref 1–1.03)
UROBILINOGEN FLD QL: 0.2 MG/DL — SIGNIFICANT CHANGE UP (ref 0.2–1)
WBC # BLD: 6.66 K/UL — SIGNIFICANT CHANGE UP (ref 3.8–10.5)
WBC # FLD AUTO: 6.66 K/UL — SIGNIFICANT CHANGE UP (ref 3.8–10.5)

## 2025-04-16 PROCEDURE — 99232 SBSQ HOSP IP/OBS MODERATE 35: CPT

## 2025-04-16 PROCEDURE — 70450 CT HEAD/BRAIN W/O DYE: CPT | Mod: 26,77

## 2025-04-16 PROCEDURE — 70450 CT HEAD/BRAIN W/O DYE: CPT | Mod: 26

## 2025-04-16 PROCEDURE — 99223 1ST HOSP IP/OBS HIGH 75: CPT

## 2025-04-16 RX ORDER — POLYETHYLENE GLYCOL 3350 17 G/17G
17 POWDER, FOR SOLUTION ORAL DAILY
Refills: 0 | Status: DISCONTINUED | OUTPATIENT
Start: 2025-04-16 | End: 2025-04-17

## 2025-04-16 RX ORDER — MELATONIN 5 MG
5 TABLET ORAL AT BEDTIME
Refills: 0 | Status: DISCONTINUED | OUTPATIENT
Start: 2025-04-16 | End: 2025-04-17

## 2025-04-16 RX ORDER — SPIRONOLACTONE 25 MG
25 TABLET ORAL DAILY
Refills: 0 | Status: DISCONTINUED | OUTPATIENT
Start: 2025-04-16 | End: 2025-04-17

## 2025-04-16 RX ORDER — ATORVASTATIN CALCIUM 80 MG/1
1 TABLET, FILM COATED ORAL
Refills: 0 | DISCHARGE

## 2025-04-16 RX ORDER — CALCIUM CARBONATE/VITAMIN D3 500MG-5MCG
1 TABLET ORAL DAILY
Refills: 0 | Status: DISCONTINUED | OUTPATIENT
Start: 2025-04-16 | End: 2025-04-17

## 2025-04-16 RX ORDER — ATORVASTATIN CALCIUM 80 MG/1
80 TABLET, FILM COATED ORAL AT BEDTIME
Refills: 0 | Status: DISCONTINUED | OUTPATIENT
Start: 2025-04-16 | End: 2025-04-17

## 2025-04-16 RX ORDER — AMIODARONE HYDROCHLORIDE 50 MG/ML
200 INJECTION, SOLUTION INTRAVENOUS DAILY
Refills: 0 | Status: DISCONTINUED | OUTPATIENT
Start: 2025-04-16 | End: 2025-04-17

## 2025-04-16 RX ORDER — AMLODIPINE BESYLATE 10 MG/1
10 TABLET ORAL DAILY
Refills: 0 | Status: DISCONTINUED | OUTPATIENT
Start: 2025-04-16 | End: 2025-04-17

## 2025-04-16 RX ORDER — MAGNESIUM SULFATE 500 MG/ML
2 SYRINGE (ML) INJECTION ONCE
Refills: 0 | Status: COMPLETED | OUTPATIENT
Start: 2025-04-16 | End: 2025-04-16

## 2025-04-16 RX ORDER — SENNA 187 MG
2 TABLET ORAL AT BEDTIME
Refills: 0 | Status: DISCONTINUED | OUTPATIENT
Start: 2025-04-16 | End: 2025-04-17

## 2025-04-16 RX ORDER — INFLUENZA A VIRUS A/IDAHO/07/2018 (H1N1) ANTIGEN (MDCK CELL DERIVED, PROPIOLACTONE INACTIVATED, INFLUENZA A VIRUS A/INDIANA/08/2018 (H3N2) ANTIGEN (MDCK CELL DERIVED, PROPIOLACTONE INACTIVATED), INFLUENZA B VIRUS B/SINGAPORE/INFTT-16-0610/2016 ANTIGEN (MDCK CELL DERIVED, PROPIOLACTONE INACTIVATED), INFLUENZA B VIRUS B/IOWA/06/2017 ANTIGEN (MDCK CELL DERIVED, PROPIOLACTONE INACTIVATED) 15; 15; 15; 15 UG/.5ML; UG/.5ML; UG/.5ML; UG/.5ML
0.5 INJECTION, SUSPENSION INTRAMUSCULAR ONCE
Refills: 0 | Status: DISCONTINUED | OUTPATIENT
Start: 2025-04-16 | End: 2025-04-17

## 2025-04-16 RX ORDER — ACETAMINOPHEN 500 MG/5ML
1000 LIQUID (ML) ORAL EVERY 6 HOURS
Refills: 0 | Status: DISCONTINUED | OUTPATIENT
Start: 2025-04-16 | End: 2025-04-17

## 2025-04-16 RX ORDER — CYST/ALA/Q10/PHOS.SER/DHA/BROC 100-20-50
1 POWDER (GRAM) ORAL DAILY
Refills: 0 | Status: DISCONTINUED | OUTPATIENT
Start: 2025-04-16 | End: 2025-04-17

## 2025-04-16 RX ORDER — TIMOLOL MALEATE 6.8 MG/ML
1 SOLUTION OPHTHALMIC
Refills: 0 | Status: DISCONTINUED | OUTPATIENT
Start: 2025-04-16 | End: 2025-04-17

## 2025-04-16 RX ADMIN — AMIODARONE HYDROCHLORIDE 200 MILLIGRAM(S): 50 INJECTION, SOLUTION INTRAVENOUS at 05:13

## 2025-04-16 RX ADMIN — POLYETHYLENE GLYCOL 3350 17 GRAM(S): 17 POWDER, FOR SOLUTION ORAL at 11:19

## 2025-04-16 RX ADMIN — Medication 1 TABLET(S): at 11:19

## 2025-04-16 RX ADMIN — Medication 2 TABLET(S): at 21:41

## 2025-04-16 RX ADMIN — Medication 1 TABLET(S): at 11:18

## 2025-04-16 RX ADMIN — TIMOLOL MALEATE 1 DROP(S): 6.8 SOLUTION OPHTHALMIC at 19:31

## 2025-04-16 RX ADMIN — Medication 5 MILLIGRAM(S): at 21:41

## 2025-04-16 RX ADMIN — ATORVASTATIN CALCIUM 80 MILLIGRAM(S): 80 TABLET, FILM COATED ORAL at 21:41

## 2025-04-16 RX ADMIN — Medication 25 GRAM(S): at 05:00

## 2025-04-16 NOTE — PROGRESS NOTE ADULT - SUBJECTIVE AND OBJECTIVE BOX
SICU Attending Progress Note    24H Events:    No acute events overnight  - Repeat CT head stable    acetaminophen   IVPB .. 1000 milliGRAM(s) IV Intermittent every 6 hours PRN  aMIOdarone    Tablet 200 milliGRAM(s) Oral daily  amLODIPine   Tablet 10 milliGRAM(s) Oral daily  atorvastatin 80 milliGRAM(s) Oral at bedtime  calcium carbonate 1250 mG  + Vitamin D (OsCal 500 + D) 1 Tablet(s) Oral daily  cetirizine 10 milliGRAM(s) Oral daily  chlorhexidine 2% Cloths 1 Application(s) Topical daily  influenza  Vaccine (HIGH DOSE) 0.5 milliLiter(s) IntraMuscular once  melatonin 5 milliGRAM(s) Oral at bedtime  multivitamin/minerals 1 Tablet(s) Oral daily  polyethylene glycol 3350 17 Gram(s) Oral daily  senna 2 Tablet(s) Oral at bedtime  spironolactone 25 milliGRAM(s) Oral daily  timolol 0.5% Solution 1 Drop(s) Both EYES two times a day    T(C): 36.4 (04-16-25 @ 16:00), Max: 36.8 (04-16-25 @ 03:15)  HR: 61 (04-16-25 @ 16:00) (49 - 63)  BP: 112/62 (04-16-25 @ 16:00) (91/60 - 125/66)  RR: 18 (04-16-25 @ 16:00) (18 - 22)  SpO2: 99% (04-16-25 @ 16:00) (94% - 100%)        PHYSICAL EXAM:    Gen: NAD  Pulmonary: no acute respiratory distress  Cardiovascular: RRR  Gastrointestinal: soft, non distended non tender to palpation  Extremities: no edema      Assessment:    74 year old female with a PMHx of AFib on Xarelto, HTN, GERD, RON, previous stroke/ TIAs, who presents after having a fall 2 days (+) head strike, (-) LOC, found to have a small falcine SDH. + R flank ecchymosis. [place] [facility] [place] [facility] Repeat CTH at 6pm 4/15 showing mildly larger R frontal SDH. Follow up scan 4/16 showing stable small SDH.        Plan:    Neuro:   - Multimodal pain regimen w/ tylenol  - Continue Melatonin 5mg qHS  - Stroke History - Hold Xarelto given SDH  - Delirium precautions  - Optimize sleep/wake cycle     CV:   - Maintain MAP > 65  - Continue hemodynamic monitoring  - pAF - Continue Amiodarone 200mg QD    Pulm:   - Maintain sPO2 >92%  - Pulmonary toilet, IS, OOBTC    GI/Nutrition:   - Continue Regular Diet  - Continue MVT + Calcium Supplementation  - Bowel regimen  - Monitor bowel movements    /Renal:   - Monitor kidney fxn  - Monitor UOP  - Replete electrolytes PRN (Mg >2, Phos >3, K >4)  - Voiding spontaneously    ID:  - Monitor fever curve  - Monitor for leukocytosis    Endo:  - Monitor blood glucose  - Maintain euglycemia, 140-180    Heme/DVT Prophylaxis:  - SCDs  - Monitor H/H  - Chemical prophylaxis held given SDH  - 4/16 - Vitamin K + Balfaxar Administration given INR 3.2   - Continue to monitor INR     Skin:  - Repositioning for DTI prevention while in bed      Dispo: Transfer to neurosurgery for further management

## 2025-04-16 NOTE — DISCHARGE NOTE PROVIDER - NSDCMRMEDTOKEN_GEN_ALL_CORE_FT
amiodarone 200 mg oral tablet: 1 tab(s) orally once a day  amLODIPine 10 mg oral tablet: 1 tab(s) orally once a day  atorvastatin 80 mg oral tablet: 1 tab(s) orally once a day  levocetirizine 5 mg oral tablet: 1 tab(s) orally once a day  spironolactone 25 mg oral tablet: 1 tab(s) orally once a day  Timolol Maleate (Eqv-Timoptic) 0.5% ophthalmic solution: 1 drop(s) in each affected eye 2 times a day  timolol ophthalmic:   Xarelto 20 mg oral tablet: 1 tab(s) orally once a day   amiodarone 200 mg oral tablet: 1 tab(s) orally once a day  amLODIPine 10 mg oral tablet: 1 tab(s) orally once a day  atorvastatin 80 mg oral tablet: 1 tab(s) orally once a day (at bedtime)  levocetirizine 5 mg oral tablet: 1 tab(s) orally once a day  Multiple Vitamins with Minerals oral tablet: 1 tab(s) orally once a day  spironolactone 25 mg oral tablet: 1 tab(s) orally once a day  timolol maleate 0.5% ophthalmic solution: 1 drop(s) to each affected eye 2 times a day  vitamin D-calcium (as carbonate) 5 mcg-500 mg oral tablet: 1 tab(s) orally once a day

## 2025-04-16 NOTE — DISCHARGE NOTE PROVIDER - HOSPITAL COURSE
73 y/o female with PMHx of  HTN, A. Fib, Stroke on Xarelto admitted to hospital on 4/15/2025 s/p fall on 4/13. Pt found to have small right falx/frontal SDH. Monitored in ICU until down graded when appropriate. Repeat CT heads were stable. Currently tolerating regular diet, voiding independently, having bowel movements and ambulating. Trauma, neurosurgery, physical therapy, occupational therapist followed the patient's course. On DATE, pt deemed medically and surgically stable for discharge. Discharged with home medications, incentive spirometer and pain medications to follow-up with Dr. Vega in 7-14 days. Instructions, medications, and hospital course was discussed with patient and/or family prior to discharge. v 75 y/o female with PMHx of HTN, A. Fib, Stroke on Xarelto admitted to hospital on 4/15/2025 s/p fall on 4/13. Pt found to have small right falx/frontal SDH. Initial 6h CTH showed slight increase in size of R frontal SDH. Monitored in ICU until downgraded when appropriate. Repeat CT heads were stable. Currently tolerating regular diet, voiding independently, having bowel movements and ambulating. Trauma, neurosurgery, physical therapy, occupational therapist followed the patient's course. On DATE, pt deemed medically and surgically stable for discharge. Discharged with home medications, incentive spirometer and pain medications to follow-up with Dr. Vega in 7-14 days. Instructions, medications, and hospital course was discussed with patient and/or family prior to discharge.    75 y/o female with PMHx of HTN, A. Fib, Stroke on Xarelto admitted to hospital on 4/15/2025 s/p fall on 4/13. Pt found to have small right falx/frontal SDH. Initial 6h CTH showed slight increase in size of R frontal SDH. Monitored in ICU until downgraded when appropriate. Repeat CT heads were stable. Currently tolerating regular diet, voiding independently, having bowel movements and ambulating. Trauma, neurosurgery, physical therapy, occupational therapist followed the patient's course. On 4/17, pt deemed medically and surgically stable for discharge. Discharged with home medications, incentive spirometer and pain medications to follow-up with Dr. Vega in 7-14 days. Instructions, medications, and hospital course was discussed with patient and/or family prior to discharge.

## 2025-04-16 NOTE — DISCHARGE NOTE PROVIDER - NSDCFUSCHEDAPPT_GEN_ALL_CORE_FT
Osmin Bergman  [facility] Physician Formerly [facility]  ELECT[address]OPH [address]  Scheduled Appointment: 05/09/2025    [facility]  [facility] Physician Formerly [facility]  [address]  Scheduled Appointment: 05/19/2025    [facility]  OPHTHALM [address]  Scheduled Appointment: 05/20/2025    Elmer Mendez  [facility]  [address]THALM [address]  Scheduled Appointment: 05/29/2025    [facility]  [facility] Physician Former[facility]  [address]  Scheduled Appointment: 06/23/2025    [facility] Physician [facility]  OP[address]THALM [address]  Scheduled Appointment: 06/24/2025    Elmer Mendez  [facility]  [address]ALM [address]  Scheduled Appointment: 07/03/2025     Palma Vega  [facility] Physician [facility]  NEUROSURG [address] [place] [address]  Scheduled Appointment: 05/08/2025    Osmin Bergman  [facility]  ELECTROPH [address]  Scheduled Appointment: 05/09/2025    Elmer Mendez  [facility]  [address]AL[address]  Scheduled Appointment: 05/19/2025    [facility]  OPHTHALM [address]  Scheduled Appointment: 05/20/2025    Elmer Mendez  [facility] Physician [facility]  OP[address]THALM [address]  Scheduled Appointment: 05/29/2025    Elmer Mendez  [facility]  [address]AL[address]  Scheduled Appointment: 06/23/2025    [facility]  OPHTHALM [address]  Scheduled Appointment: 06/24/2025    Elmer Mendez  [facility] Phys[facility]  OPHTHALM [address]  Scheduled Appointment: 07/02/2025    Elmer Mendez  [facility] Physician [facility]  OPHTHALM [address]  Scheduled Appointment: 07/24/2025    Elmer Mendez  [facility] Phys[facility]  OP[address]THALM [address]  Scheduled Appointment: 07/25/2025

## 2025-04-16 NOTE — DISCHARGE NOTE PROVIDER - NSDCCPCAREPLAN_GEN_ALL_CORE_FT
PRINCIPAL DISCHARGE DIAGNOSIS  Diagnosis: SDH (subdural hematoma)  Assessment and Plan of Treatment:       SECONDARY DISCHARGE DIAGNOSES  Diagnosis: Thyroid nodule  Assessment and Plan of Treatment: Sometimes during the process of diagnosis and treatment for one disorder, a second problem or abnormality is found. We call this an incidental finding. An incidental finding is not related to why you came into the hospital, but is something that should be followed up on after you leave the hospital.  During your care, the following incidental finding was identified and discussed with you or your surrogate: Thyroid nodule   Seen on: CT   This finding should be followed up by: PCP and/or Endocrinologist upon discharge

## 2025-04-16 NOTE — DISCHARGE NOTE PROVIDER - CARE PROVIDER_API CALL
Palma Vega  Neurosurgery  [address] [place] [id number]  Phone: [phone]  Fax: [phone]  Follow Up Time: 1 week

## 2025-04-16 NOTE — DISCHARGE NOTE PROVIDER - NSDCFUADDINST_GEN_ALL_CORE_FT
Diet: Please hold aspirin, NSAIDs, goody's powder, anticoagulation, vitamin E, turmeric/m[place]leton lattes, cinnamon pills, fish oil, ginseng, and all other supplements until cleared by your neurosurgeon on an outpatient follow up appointment  ***DO NOT TAKE XARELTO UNTIL INSTRUCTED TO RESTART BY DR. HOWELL WHEN SEEN IN OFFICE FOR FOLLOW UP****    Activity: Avoid straining, heavy lifting (objects greater than 10 pounds), strenuous activity, twisting, bending, and vigorous exercise. You should not drive or operate machinery until cleared by your neurosurgeon.     Appointment(s): Follow up with Dr. Howell in our office outpatient in 1-2 weeks. Call [phone] to schedule an appointment. It is also important to see your primary physician to keep them up to date regarding your recent admission and for a formal outpatient comprehensive medical review. Call their offices for an appointment as soon as you leave the hospital. If you do not have a primary physician, you may contact the [facility] [place] [phone] located on [address] [place] [address].    Should you develop any new or worsening neurologic symptoms, please call our office immediately or visit the emergency department.    Return to ER immediately for any of the following: fever, bleeding, new onset numbness/tingling/weakness, nausea and/or vomiting, chest pain, shortness of breath, confusion, seizure, altered mental status, urinary and/or fecal incontinence or retention.

## 2025-04-16 NOTE — CONSULT NOTE ADULT - ASSESSMENT
74 year old female with a PMHx of AFib on Xarelto, HTN, GERD, RON, previous stroke/ TIAs, who presents after having a fall 2 days (+) head strike, (-) LOC, found to have a small falcine SDH. + R flank ecchymosis. [place] [facility] [place] [facility].  Repeat CTH at 6pm 4/15 showing mildly larger R frontal SDH. Follow up scan 4/16 showing stable small SDH.    PLAN:  - q2h neuro checks  - obtain 24hr CTH from last stable scan (planned for 4/16 at 6PM)  - AED: not indicated   - Pain control prn: tylenol prn  - continue melatonin at bedtime for insomnia  - PT/OT/OOB as tolerated     Hx of PAF SBP goal 100-160  - continue home amiodarone 200mg QD for rate control     PULM:  sat > 92%  - encourage incentive spirometry     RENAL:  IVL  - replete electrolytes as needed   - continue multivitamin + Calcium Supplementation  - holding home spironolactone at this time    GASTRO:  tolerating regular diet with ensure plus 3x/day  - bowel regimen: continue miralax and senna    HEME:  monitor H/H, INR  - DVT prophylaxis: SCDs, hold chemoppx until 24 hrs stability scan  - b/l LE dopplers pending to R/O DVT as high risk on admission  - hold home Xarelto  - 4/16 - Vitamin K + Balfaxar Administration given INR 3.2     ENDO:  goal euglycemia    ID:  afebrile    DISPO: transferred from trauma service to [facility] service today 4/16 on SDU, likely d/c tomorrow 4/17 once stable scan  -Case and plan discussed with Dr. Vega     74 year old female with a PMHx of AFib on Xarelto, HTN, GERD, RON, previous stroke/ TIAs, who presents after having a fall 2 days (+) head strike, (-) LOC, found to have a small falcine SDH. + R flank ecchymosis. transferred [place] [facility].  Repeat CTH at 6pm 4/15 showing mildly larger R frontal SDH. Follow up scan 4/16 showing stable small SDH.    PLAN:  - q2h neuro checks  - obtain 24hr CTH from last stable scan (planned for 4/16 at 6PM)  - AED: not indicated   - Pain control prn: tylenol prn  - continue melatonin at bedtime for insomnia  - PT/OT/OOB as tolerated     Hx of PAF SBP goal 100-160  - continue home amiodarone 200mg QD for rate control ,   cardiac monitoring   Hold Xarelto for now , not on any BB     HTN - continue Amlodipine with parameters     HLD: continue statin  home dose     PULM:  sat > 92%  - encourage incentive spirometry     RENAL:  IVL  - replete electrolytes as needed   - continue multivitamin + Calcium Supplementation  - holding home spironolactone at this time    GASTRO:  tolerating regular diet with ensure plus 3x/day  - bowel regimen: continue miralax and senna    HEME:  monitor H/H, INR  - DVT prophylaxis: SCDs, hold chemoppx until 24 hrs stability scan  - b/l LE dopplers pending to R/O DVT as high risk on admission  - hold home Xarelto  - 4/16 - Vitamin K + Balfaxar Administration given INR 3.2     ENDO:  goal euglycemia    ID:  afebrile    Geriatric Screening:    Functional Assessment: [x ] Independent [ ] Assistance [ ] Total care [ ] Non-ambulatory    [ ] Fall risks identified: pending physical eval     [x ] High risk medications identified: Xarelto    [ ] Probable osteoporosis    [ ] Possible osteomalacia    [ ] Increased delirium risk    [ x] Delirium and other risks can be reduced by:    -early ambulation    -minimizing "tethers" - IV, oxygen, catheters, etc    -avoiding hypnotics and sedatives    -maintaining hydration/nutrition    -avoid anticholinergics - diphenhydramine, etc    -pain control    -supportive environment    Advanced Directives: [ ] DNR [ ] No feeding tube [ ] MOLST in chart [ ] MOLST completed today [ ] Unknown- to be discussed     Thank you for the courtesy of this consult .

## 2025-04-16 NOTE — CHART NOTE - NSCHARTNOTEFT_GEN_A_CORE
SICU TRANSFER NOTE  -----------------------------  ICU Admission Date: 4/16  Transfer Date: 04-16-25 @ 12:06    Admission Diagnosis:   1. SDH    Active Problems/injuries:   1. SDH    Procedures: XXXXX INCLUDE DATES    Consultants:    [x] Medicine - Geriatric Trauma Eval  [X] Neurosurgery - SDH    Medications  acetaminophen   IVPB .. 1000 milliGRAM(s) IV Intermittent every 6 hours PRN  aMIOdarone    Tablet 200 milliGRAM(s) Oral daily  calcium carbonate 1250 mG  + Vitamin D (OsCal 500 + D) 1 Tablet(s) Oral daily  chlorhexidine 2% Cloths 1 Application(s) Topical daily  influenza  Vaccine (HIGH DOSE) 0.5 milliLiter(s) IntraMuscular once  melatonin 5 milliGRAM(s) Oral at bedtime  multivitamin/minerals 1 Tablet(s) Oral daily  polyethylene glycol 3350 17 Gram(s) Oral daily  senna 2 Tablet(s) Oral at bedtime      [X] I attest I have reviewed and reconciled all medications prior to transfer    ASSESSMENT: 73 yo female with a pmhx of HTN, A. Fib, Stroke on Xarelto who reports a fall 2 days ago. Patient states that she was tying her shoe and fell backwards. (-) LOC, (+) Head strike. Pt went to urgent care for R flank ecchymosis, advised to go to ED. Went to [place]. Transferred to [facility].    PLAN: I have discussed this case with Neurosurgery upon transfer and all questions regarding ICU course were answered.    Neuro:   - Multimodal pain regimen w/ tylenol  - Continue Melatonin 5mg qHS  - Stroke History - Hold Xarelto given SDH  - Delirium precautions  - Optimize sleep/wake cycle     CV:   - Maintain MAP > 65  - Continue hemodynamic monitoring  - pAF - Continue Amiodarone 200mg QD    Pulm:   - Maintain sPO2 >92%  - Pulmonary toilet, IS, OOBTC    GI/Nutrition:   - Continue Regular Diet  - Continue MVT + Calcium Supplementation  - Bowel regimen  - Monitor bowel movements    /Renal:   - Monitor kidney fxn  - Monitor UOP  - Replete electrolytes PRN (Mg >2, Phos >3, K >4)  - Voiding spontaneously    ID:  - Monitor fever curve  - Monitor for leukocytosis    Endo:  - Monitor blood glucose  - Maintain euglycemia, 140-180    Heme/DVT Prophylaxis:  - SCDs  - Monitor H/H  - Chemical prophylaxis held given SDH  - 4/16 - Vitamin K + Balfaxar Administration given INR 3.2   - Continue to monitor INR     Skin:  - Repositioning for DTI prevention while in bed    Lines:  - Peripheral IV's    Dispo:  - Care per Neurosurgery  - PT / OT Recs   - 24H CTH @ 6PM     Tertiary [X]  Geriatric Consult [X]  GOC [X]   Blood Consent 4/16 [X]  CODE STATUS: Full Code

## 2025-04-16 NOTE — PROGRESS NOTE ADULT - ASSESSMENT
74 year old female with a PMHx of AFib on Xarelto, HTN, GERD, RON, previous stroke/ TIAs, who presents after having a fall 2 days (+) head strike, (-) LOC, found to have a small falcine SDH. + R flank ecchymosis. transferred from [place] to [facility].    Repeat CTH at 6pm 4/15 showing mildly larger R frontal SDH. Follow up scan 4/16 showing stable small SDH.    PLAN:  - q2h neuro checks  - obtain 24hr CTH from last stable scan (planned for 4/16 at 6PM)  - AED: not indicated   - Pain control prn: tylenol prn  - continue melatonin at bedtime for insomnia  - PT/OT/OOB as tolerated     CARDS:  SBP goal 100-160  - continue home amiodarone for rate control     PULM:  sat > 92%  - encourage incentive spirometry     RENAL:  IVL  - replete electrolytes as needed   - continue multivitamin  - holding home spironolactone at this time    GASTRO:  tolerating regular diet with ensure plus 3x/day  - bowel regimen: continue miralax and senna    HEME:  monitor H/H, INR  -  DVT prophylaxis: SCDs, hold chemoppx until 24 hrs stability scan  -  b/l LE dopplers pending to R/O DVT as high risk on admission  - hold home Xarelto    ENDO:  goal euglycemia    ID:  afebrile    DISPO: transferred from trauma service to NSG service today 4/16 on SDU, likely d/c tomorrow 4/17 once stable scan  -Case and plan discussed with Dr. Vega   74 year old female with a PMHx of AFib on Xarelto, HTN, GERD, RON, previous stroke/ TIAs, who presents after having a fall 2 days (+) head strike, (-) LOC, found to have a small falcine SDH. + R flank ecchymosis. transferred from [facility] to [facility].    Repeat CTH at 6pm 4/15 showing mildly larger R frontal SDH. Follow up scan 4/16 showing stable small SDH.    PLAN:  - q2h neuro checks  - obtain 24hr CTH from last stable scan (planned for 4/16 at 6PM)  - AED: not indicated   - Pain control prn: tylenol prn  - continue melatonin at bedtime for insomnia  - PT/OT/OOB as tolerated     CARDS:  SBP goal 100-160  - continue home amiodarone 200mg QD for rate control     PULM:  sat > 92%  - encourage incentive spirometry     RENAL:  IVL  - replete electrolytes as needed   - continue multivitamin + Calcium Supplementation  - holding home spironolactone at this time    GASTRO:  tolerating regular diet with ensure plus 3x/day  - bowel regimen: continue miralax and senna    HEME:  monitor H/H, INR  - DVT prophylaxis: SCDs, hold chemoppx until 24 hrs stability scan  - b/l LE dopplers pending to R/O DVT as high risk on admission  - hold home Xarelto  - 4/16 - Vitamin K + Balfaxar Administration given INR 3.2     ENDO:  goal euglycemia    ID:  afebrile    DISPO: transferred from trauma service to [facility] service today 4/16 on SDU, likely d/c tomorrow 4/17 once stable scan  -Case and plan discussed with Dr. Vega

## 2025-04-16 NOTE — CONSULT NOTE ADULT - SUBJECTIVE AND OBJECTIVE BOX
PMD :  Cardio :     Patient is a 74y old  Female who presents with a chief c/o fall on 413 , noted with R flank ecchymosis .   Found to have small SDH . Initially admitted to trauma surgery , now transferred to NS service.   Medicine asked by trauma service to for medical co mgmt .   Patient seen and examined . Camille sob/chest pain , denies n/v , AAOX4 .     CC: fall , R flank ecchymosis       HPI:  74 year old female with a PMHx of AFib on Xarelto, HTN, GERD, RON, previous stroke/ TIAs, who presents after having a fall 2 days (+) head strike, (-) LOC, found to have a small falcine SDH. + R flank ecchymosis. transferred [facility].  Repeat CTH at 6pm 4/15 showing mildly larger R frontal SDH. Follow up scan  showing stable small SDH.      PAST MEDICAL & SURGICAL HISTORY:  Paroxysmal atrial fibrillation      RON (obstructive sleep apnea)      GERD (gastroesophageal reflux disease)      History of TIAs      H/O  section        FAMILY HISTORY:  Mother with breast cancer   Patient had last mammogram 1 yr ago - normal     SOCIAL HISTORY:  Denies any toxic habits    ALLERGIES: NKA No Known Allergies      HOME MEDICATIONS:   Home Medications:  amiodarone 200 mg oral tablet: 1 tab(s) orally once a day (20 Dec 2024 08:50)  levocetirizine 5 mg oral tablet: 1 tab(s) orally once a day (19 Dec 2024 10:17)  spironolactone 25 mg oral tablet: 1 tab(s) orally once a day (19 Dec 2024 21:20)  Xarelto 20 mg oral tablet: 1 tab(s) orally once a day (19 Dec 2024 21:20)  Amlodipine 10 mg daily   Timolol eye drop         PHYSICAL EXAM:   General: NAD, Lying in bed comfortably  Neuro: A+Ox3, forehead bruises  HEENT: EOMI, PERRLA, MMM  Cardio: RRR  Resp: Non labored breathing on RA  GI/Abd: Soft, NT/ND, no rebound/guarding, no masses palpated  Vascular: All 4 extremities warm and well perfused.   Pelvis: stable  Musculoskeletal: All 4 extremities moving spontaneously, no limitations, no spinal tenderness. L flank ecchymosis    LABS                 10.8   7.30   )----------(  213       ( 15 Apr 2025 17:12 )               32.0      137    |  98     |  15.6   ----------------------------<  91         ( 15 Apr 2025 17:12 )  4.3     |  25.0   |  1.11     Ca    9.5        ( 15 Apr 2025 17:12 )  Phos  3.5       ( 15 Apr 2025 17:12 )  Mg     2.0       ( 15 Apr 2025 17:12 )            CAPILLARY BLOOD GLUCOSE      REVIEW OF SYSTEMS:    As above , all other systems are reviewed and are negative .       MEDICATIONS  (STANDING):  aMIOdarone    Tablet 200 milliGRAM(s) Oral daily  amLODIPine   Tablet 10 milliGRAM(s) Oral daily  atorvastatin 80 milliGRAM(s) Oral at bedtime  calcium carbonate 1250 mG  + Vitamin D (OsCal 500 + D) 1 Tablet(s) Oral daily  cetirizine 10 milliGRAM(s) Oral daily  chlorhexidine 2% Cloths 1 Application(s) Topical daily  influenza  Vaccine (HIGH DOSE) 0.5 milliLiter(s) IntraMuscular once  melatonin 5 milliGRAM(s) Oral at bedtime  multivitamin/minerals 1 Tablet(s) Oral daily  polyethylene glycol 3350 17 Gram(s) Oral daily  senna 2 Tablet(s) Oral at bedtime  spironolactone 25 milliGRAM(s) Oral daily  timolol 0.5% Solution 1 Drop(s) Both EYES two times a day    MEDICATIONS  (PRN):  acetaminophen   IVPB .. 1000 milliGRAM(s) IV Intermittent every 6 hours PRN Mild Pain (1 - 3), Moderate Pain (4 - 6), Severe Pain (7 - 10)      Vital Signs Last 24 Hrs  T(C): 36.4 (2025 16:00), Max: 36.8 (2025 03:15)  T(F): 97.6 (2025 16:00), Max: 98.3 (2025 03:15)  HR: 61 (2025 16:00) (49 - 63)  BP: 112/62 (2025 16:00) (91/60 - 125/66)  BP(mean): 70 (15 Apr 2025 22:30) (64 - 87)  RR: 18 (2025 16:00) (18 - 22)  SpO2: 99% (2025 16:00) (94% - 100%)    Parameters below as of 2025 16:00  Patient On (Oxygen Delivery Method): room air        I&O's Detail      LABS:                        10.5   6.66  )-----------( 194      ( 2025 03:45 )             31.7     04-16    137  |  102  |  13.9  ----------------------------<  83  4.1   |  22.0  |  1.04    Ca    8.9      2025 03:45  Phos  3.5     04-16  Mg     1.9     04-16    TPro  6.2[L]  /  Alb  3.5  /  TBili  0.5  /  DBili  0.1  /  AST  35[H]  /  ALT  32  /  AlkPhos  72  04-16    PT/INR - ( 2025 03:45 )   PT: 23.1 sec;   INR: 2.06 ratio         PTT - ( 2025 03:45 )  PTT:36.9 sec    RADIOLOGY & ADDITIONAL STUDIES:    < from: CT Head No Cont (04.15.25 @ 18:08) >    ACC: [id number] EXAM:  CT BRAIN   ORDERED BY: SUGEY HODGES     PROCEDURE DATE:  04/15/2025      < end of copied text >  < from: CT Head No Cont (04.15.25 @ 18:08) >    IMPRESSION: Presence of intravenous contrast limits interpretation. Small   mid to posterior parafalcine subdural hemorrhage and right tentorial   leaflet subdural hemorrhage are not significantly changed within this   limitation. Small right occipital convexity subdural hemorrhage on image   25 of series 2, better seen on this study than the prior study,   unchanged. Small anterior right frontal convexity subdural hemorrhage   measuring 5.1 mm in thickness, mildly larger in size. Continued follow-up   is recommended..    --- End of Report ---    < from: CT Head No Cont (25 @ 00:58) >    ACC: [id number] EXAM:  CT BRAIN   ORDERED BY: AUSTIN BENITEZ     PROCEDURE DATE:  2025        < end of copied text >  < from: CT Head No Cont (25 @ 00:58) >    IMPRESSION:  Stable small subdural hemorrhages without evidence of interval rebleeding.    --- End of Report ---      < from: 12 Lead ECG (04.15.25 @ 22:41) >    Ventricular Rate 49 BPM    Atrial Rate 49 BPM    P-R Interval 230 ms    QRS Duration 94 ms    Q-T Interval 488 ms    QTC Calculation(Bazett) 440 ms    P Axis 72 degrees    R Axis 17 degrees    T Axis 99 degrees    Diagnosis Line Sinus bradycardia with 1st degree A-V block  Nonspecific ST and T wave abnormality  Abnormal ECG    Confirmed by Troy Key MD ([id number]) on 2025 7:32:30 AM    < end of copied text >

## 2025-04-16 NOTE — CHART NOTE - NSCHARTNOTEFT_GEN_A_CORE
Tertiary Trauma Survey (TTS)    Date of TTS: 04-16-25 @ 12:59                             Admit Date: 04-15-25 @ 18:45      Trauma Activation:    List Injuries Identified to Date:  -Subdural hemorrhage on     List Operative and Interventional Radiological Procedures:   n/a        Physical Exam: AOx3    Neuro: Normal EOMI. Pupil are PERRL. No facial droop. Motor is intact, strength is 5/5 in all extremities. Sensory is intact. No nystagmus. No Dysmetria    Pulm/Chest: bilateral symmetrical chest rise. Lung sounds are clear bilaterally.    Cardiac: no acute findings    GI / Abdomen: soft in all four quadrants. no rebound tenderness or guarding.    Musculoskeletal / Extremities: No step-offs in the vertebrae. Radial pulses are 2+. Dorsalis pedis pulses are palpable.     Integumentary:   -ecchymosis of the skin above the spinous processes in the c7-c8  -bruising on the R flank ecchymosis  -no petechiae     RADIOLOGICAL FINDINGS REVIEW:  < from: CT Head No Cont (04.16.25 @ 00:58) >    FINDINGS:    VENTRICLES AND SULCI: Age appropriate involutional changes.  INTRA-AXIAL: No mass effect, acute hemorrhage, or midline shift.  There   are moderate patchy white matter hypodensities consistent with   microvascular-type changes and/or old white matter infarcts.  EXTRA-AXIAL: Small heterogeneous anterior right frontal subdural   hemorrhage measuring up 7 mm in caliber (2:13) is stable.  Small acute   hyperattenuating subdural hemorrhage along the cerebral falx measuring up   to 3 mm in thickness (2:37) is stable.  Trace subdural hemorrhage along   the right cerebellar tentorium is stable.    VISUALIZED SINUSES:  Clear.  TYMPANOMASTOID CAVITIES:  Clear.  VISUALIZED ORBITS: Normal.  CALVARIUM: Intact.    MISCELLANEOUS: None.      IMPRESSION:  Stable small subdural hemorrhages without evidence of interval rebleeding.    < end of copied text >    CT CAP read pending  INCIDENTAL FINDINGS:    [ ] No    [x] Yes, Findings are:  -small thyroid nodule  -scarring of the lung  -fibroids of the uterus      [x] Tertiary exam complete, there are no new injuries identified    [x] Tertiary exam done, new injuries identified are:                [x] Imaging ordered: 24hr CT-Head today at 12pm. Tertiary Trauma Survey (TTS)    Date of TTS: 04-16-25 @ 12:59                             Admit Date: 04-15-25 @ 18:45      Trauma Activation:    List Injuries Identified to Date:  -Subdural hemorrhage on     List Operative and Interventional Radiological Procedures:   n/a        Physical Exam: AOx3    Neuro: Normal EOMI. Pupil are PERRL. No facial droop. Motor is intact, strength is 5/5 in all extremities. Sensory is intact. No nystagmus. No Dysmetria    Pulm/Chest: bilateral symmetrical chest rise. Lung sounds are clear bilaterally.    Cardiac: no acute findings    GI / Abdomen: soft in all four quadrants. no rebound tenderness or guarding.    Musculoskeletal / Extremities: No step-offs in the vertebrae. Radial pulses are 2+. Dorsalis pedis pulses are palpable.     Integumentary:   -ecchymosis of the skin above the spinous processes in the c7-c8  -bruising on the R flank ecchymosis  -no petechiae     RADIOLOGICAL FINDINGS REVIEW:  < from: CT Head No Cont (04.16.25 @ 00:58) >    FINDINGS:    VENTRICLES AND SULCI: Age appropriate involutional changes.  INTRA-AXIAL: No mass effect, acute hemorrhage, or midline shift.  There   are moderate patchy white matter hypodensities consistent with   microvascular-type changes and/or old white matter infarcts.  EXTRA-AXIAL: Small heterogeneous anterior right frontal subdural   hemorrhage measuring up 7 mm in caliber (2:13) is stable.  Small acute   hyperattenuating subdural hemorrhage along the cerebral falx measuring up   to 3 mm in thickness (2:37) is stable.  Trace subdural hemorrhage along   the right cerebellar tentorium is stable.    VISUALIZED SINUSES:  Clear.  TYMPANOMASTOID CAVITIES:  Clear.  VISUALIZED ORBITS: Normal.  CALVARIUM: Intact.    MISCELLANEOUS: None.      IMPRESSION:  Stable small subdural hemorrhages without evidence of interval rebleeding.    < end of copied text >    CT CAP read pending  INCIDENTAL FINDINGS:    [ ] No    [x] Yes, Findings are:  -small thyroid nodule. Pt was told of this and will FU PMD  -scarring of the lung Pt was told of this and stated she had already been aware and is currently being followed.  -fibroids of the uterus. Pt was told of this and stated she had already been aware and is currently being followed.      [x] Tertiary exam complete, there are no new injuries identified                  [x] Imaging ordered: 24hr CT-Head today at 12pm.

## 2025-04-16 NOTE — CONSULT NOTE ADULT - CONSULT REQUESTED DATE/TIME
Addended by: SARA CARDONA on: 2/9/2021 03:44 PM     Modules accepted: Orders    
15-Apr-2025 15:46
16-Apr-2025 16:43

## 2025-04-16 NOTE — PROGRESS NOTE ADULT - SUBJECTIVE AND OBJECTIVE BOX
HPI: " 75 yo female with a pmhx of HTN, A. Fib, Stroke on Xarelto last taken was today, PSH of  who reports a fall 2 days ago. Patient states that she was tying her soon and fell backwards. (-) LOC, (+) Head strike. Patient states that she presented to the ED today because of a black and blue on her back. She denies any headaches, vision changes, weakness in her arms or legs or any other complaints. "     ROS: 10-system review is otherwise negative except HPI above.      PAST MEDICAL & SURGICAL HISTORY:  Paroxysmal atrial fibrillation  RON (obstructive sleep apnea)  GERD (gastroesophageal reflux disease)  History of TIAs  H/O  section        FAMILY HISTORY:  No pertinent family history in first degree relatives    Family history not pertinent as reviewed with the patient.    SOCIAL HISTORY:  Denies any toxic habits    ALLERGIES: NKA No Known Allergies      HOME MEDICATIONS:   Home Medications:  amiodarone 200 mg oral tablet: 1 tab(s) orally once a day (20 Dec 2024 08:50)  levocetirizine 5 mg oral tablet: 1 tab(s) orally once a day (19 Dec 2024 10:17)  spironolactone 25 mg oral tablet: 1 tab(s) orally once a day (19 Dec 2024 21:20)  Xarelto 20 mg oral tablet: 1 tab(s) orally once a day (19 Dec 2024 21:20)     (15 Apr 2025 19:28)      INTERVAL HPI/OVERNIGHT EVENTS:  Patient seen and examined at bedside resting comfortably. Denies headache, weakness, numbness, n/v/d, fevers, chills, chest pain, SOB.     Vital Signs Last 24 Hrs  T(C): 36.7 (2025 12:00), Max: 37.1 (15 Apr 2025 16:30)  T(F): 98 (2025 12:00), Max: 98.7 (15 Apr 2025 16:30)  HR: 57 (2025 12:00) (49 - 64)  BP: 110/68 (2025 12:00) (91/60 - 125/66)  BP(mean): 70 (15 Apr 2025 22:30) (64 - 87)  RR: 18 (2025 12:00) (16 - 22)  SpO2: 97% (2025 12:00) (93% - 99%)    Parameters below as of 2025 12:00  Patient On (Oxygen Delivery Method): room air        PHYSICAL EXAM:  GENERAL: NAD  HEAD:  Atraumatic, normocephalic  MENTAL STATUS: AAO x3 to person place and thing; Awake; b/l pupils 4mm react, EOMI, previous mild nasolabial fold flattening not noted- face symmetric, Opens eyes spontaneously; Appropriately conversant without aphasia; following commands  MOTOR: GUERRERO 5/5 strength b/l upper and lower extremities  SENSATION: SILT  COORDINATION: not assessed  CHEST/LUNG: nonlabored breathing  ABDOMEN: Soft, nontender, nondistended  EXTREMITIES:  2+ peripheral pulses, no clubbing, cyanosis, or edema  SKIN: Warm, dry; no rashes or lesions    LABS:                        10.5   6.66  )-----------( 194      ( 2025 03:45 )             31.7     04-16    137  |  102  |  13.9  ----------------------------<  83  4.1   |  22.0  |  1.04    Ca    8.9      2025 03:45  Phos  3.5     04-16  Mg     1.9     04-16    TPro  6.2[L]  /  Alb  3.5  /  TBili  0.5  /  DBili  0.1  /  AST  35[H]  /  ALT  32  /  AlkPhos  72  04-16    PT/INR - ( 2025 03:45 )   PT: 23.1 sec;   INR: 2.06 ratio         PTT - ( 2025 03:45 )  PTT:36.9 sec  Urinalysis Basic - ( 2025 08:23 )    Color: Yellow / Appearance: Clear / SG: >1.030 / pH: x  Gluc: x / Ketone: Trace mg/dL  / Bili: Negative / Urobili: 0.2 mg/dL   Blood: x / Protein: Trace mg/dL / Nitrite: Negative   Leuk Esterase: Negative / RBC: x / WBC x   Sq Epi: x / Non Sq Epi: x / Bacteria: x          RADIOLOGY & ADDITIONAL TESTS:  < from: CT Head No Cont (25 @ 00:58) >      IMPRESSION:  Stable small subdural hemorrhages without evidence of interval rebleeding.    --- End of Report ---    < end of copied text >    < from: CT Head No Cont (04.15.25 @ 18:08) >    IMPRESSION: Presence of intravenous contrast limits interpretation. Small   mid to posterior parafalcine subdural hemorrhage and right tentorial   leaflet subdural hemorrhage are not significantly changed within this   limitation. Small right occipital convexity subdural hemorrhage on image   25 of series 2, better seen on this study than the prior study,   unchanged. Small anterior right frontal convexity subdural hemorrhage   measuring 5.1 mm in thickness, mildly larger in size. Continued follow-up   is recommended..    --- End of Report ---    < end of copied text >

## 2025-04-16 NOTE — PATIENT PROFILE ADULT - FALL HARM RISK - HARM RISK INTERVENTIONS
Assistance with ambulation/Assistance OOB with selected safe patient handling equipment/Communicate Risk of Fall with Harm to all staff/Discuss with provider need for PT consult/Monitor for mental status changes/Monitor gait and stability/Provide patient with walking aids - walker, cane, crutches/Reinforce activity limits and safety measures with patient and family/Review medications for side effects contributing to fall risk/Tailored Fall Risk Interventions/Use of alarms - bed, chair and/or voice tab/Visual Cue: Yellow wristband and red socks/Bed in lowest position, wheels locked, appropriate side rails in place/Call bell, personal items and telephone in reach/Instruct patient to call for assistance before getting out of bed or chair/Non-slip footwear when patient is out of bed/[place] to call system/Physically safe environment - no spills, clutter or unnecessary equipment/Purposeful Proactive Rounding/Room/bathroom lighting operational, light cord in reach

## 2025-04-16 NOTE — DISCHARGE NOTE PROVIDER - NSDCFUADDAPPT_GEN_ALL_CORE_FT
Please call and follow up with your cardiologist regarding your asymptomatic bradycardia noted on this admission.   Please call and review your hospital stay with your primary care physician

## 2025-04-17 ENCOUNTER — NON-APPOINTMENT (OUTPATIENT)
Age: 75
End: 2025-04-17

## 2025-04-17 ENCOUNTER — TRANSCRIPTION ENCOUNTER (OUTPATIENT)
Age: 75
End: 2025-04-17

## 2025-04-17 VITALS
TEMPERATURE: 99 F | DIASTOLIC BLOOD PRESSURE: 73 MMHG | SYSTOLIC BLOOD PRESSURE: 116 MMHG | OXYGEN SATURATION: 98 % | HEART RATE: 50 BPM | RESPIRATION RATE: 18 BRPM

## 2025-04-17 DIAGNOSIS — I61.9 NONTRAUMATIC INTRACEREBRAL HEMORRHAGE, UNSPECIFIED: ICD-10-CM

## 2025-04-17 LAB
ANION GAP SERPL CALC-SCNC: 12 MMOL/L — SIGNIFICANT CHANGE UP (ref 5–17)
APTT BLD: 31.9 SEC — SIGNIFICANT CHANGE UP (ref 24.5–35.6)
BASOPHILS # BLD AUTO: 0.02 K/UL — SIGNIFICANT CHANGE UP (ref 0–0.2)
BASOPHILS NFR BLD AUTO: 0.3 % — SIGNIFICANT CHANGE UP (ref 0–2)
BUN SERPL-MCNC: 12.2 MG/DL — SIGNIFICANT CHANGE UP (ref 8–20)
CALCIUM SERPL-MCNC: 9.2 MG/DL — SIGNIFICANT CHANGE UP (ref 8.4–10.5)
CHLORIDE SERPL-SCNC: 101 MMOL/L — SIGNIFICANT CHANGE UP (ref 96–108)
CO2 SERPL-SCNC: 24 MMOL/L — SIGNIFICANT CHANGE UP (ref 22–29)
CREAT SERPL-MCNC: 1.15 MG/DL — SIGNIFICANT CHANGE UP (ref 0.5–1.3)
EGFR: 49 ML/MIN/1.73M2 — LOW
EGFR: 49 ML/MIN/1.73M2 — LOW
EOSINOPHIL # BLD AUTO: 0.13 K/UL — SIGNIFICANT CHANGE UP (ref 0–0.5)
EOSINOPHIL NFR BLD AUTO: 1.8 % — SIGNIFICANT CHANGE UP (ref 0–6)
GLUCOSE SERPL-MCNC: 91 MG/DL — SIGNIFICANT CHANGE UP (ref 70–99)
HCT VFR BLD CALC: 32.7 % — LOW (ref 34.5–45)
HGB BLD-MCNC: 11.1 G/DL — LOW (ref 11.5–15.5)
IMM GRANULOCYTES # BLD AUTO: 0.01 K/UL — SIGNIFICANT CHANGE UP (ref 0–0.07)
IMM GRANULOCYTES NFR BLD AUTO: 0.1 % — SIGNIFICANT CHANGE UP (ref 0–0.9)
INR BLD: 1.29 RATIO — HIGH (ref 0.85–1.16)
LYMPHOCYTES # BLD AUTO: 1.73 K/UL — SIGNIFICANT CHANGE UP (ref 1–3.3)
LYMPHOCYTES NFR BLD AUTO: 23.7 % — SIGNIFICANT CHANGE UP (ref 13–44)
MAGNESIUM SERPL-MCNC: 2.3 MG/DL — SIGNIFICANT CHANGE UP (ref 1.6–2.6)
MCHC RBC-ENTMCNC: 30.2 PG — SIGNIFICANT CHANGE UP (ref 27–34)
MCHC RBC-ENTMCNC: 33.9 G/DL — SIGNIFICANT CHANGE UP (ref 32–36)
MCV RBC AUTO: 89.1 FL — SIGNIFICANT CHANGE UP (ref 80–100)
MONOCYTES # BLD AUTO: 0.78 K/UL — SIGNIFICANT CHANGE UP (ref 0–0.9)
MONOCYTES NFR BLD AUTO: 10.7 % — SIGNIFICANT CHANGE UP (ref 2–14)
NEUTROPHILS # BLD AUTO: 4.64 K/UL — SIGNIFICANT CHANGE UP (ref 1.8–7.4)
NEUTROPHILS NFR BLD AUTO: 63.4 % — SIGNIFICANT CHANGE UP (ref 43–77)
NRBC # BLD AUTO: 0 K/UL — SIGNIFICANT CHANGE UP (ref 0–0)
NRBC # FLD: 0 K/UL — SIGNIFICANT CHANGE UP (ref 0–0)
NRBC BLD AUTO-RTO: 0 /100 WBCS — SIGNIFICANT CHANGE UP (ref 0–0)
PHOSPHATE SERPL-MCNC: 3.5 MG/DL — SIGNIFICANT CHANGE UP (ref 2.4–4.7)
PLATELET # BLD AUTO: 231 K/UL — SIGNIFICANT CHANGE UP (ref 150–400)
PMV BLD: 11 FL — SIGNIFICANT CHANGE UP (ref 7–13)
POTASSIUM SERPL-MCNC: 4.9 MMOL/L — SIGNIFICANT CHANGE UP (ref 3.5–5.3)
POTASSIUM SERPL-SCNC: 4.9 MMOL/L — SIGNIFICANT CHANGE UP (ref 3.5–5.3)
PROTHROM AB SERPL-ACNC: 14.9 SEC — HIGH (ref 9.9–13.4)
RBC # BLD: 3.67 M/UL — LOW (ref 3.8–5.2)
RBC # FLD: 13.4 % — SIGNIFICANT CHANGE UP (ref 10.3–14.5)
SODIUM SERPL-SCNC: 137 MMOL/L — SIGNIFICANT CHANGE UP (ref 135–145)
WBC # BLD: 7.31 K/UL — SIGNIFICANT CHANGE UP (ref 3.8–10.5)
WBC # FLD AUTO: 7.31 K/UL — SIGNIFICANT CHANGE UP (ref 3.8–10.5)

## 2025-04-17 PROCEDURE — 85730 THROMBOPLASTIN TIME PARTIAL: CPT

## 2025-04-17 PROCEDURE — 70450 CT HEAD/BRAIN W/O DYE: CPT | Mod: MC

## 2025-04-17 PROCEDURE — 86850 RBC ANTIBODY SCREEN: CPT

## 2025-04-17 PROCEDURE — 83735 ASSAY OF MAGNESIUM: CPT

## 2025-04-17 PROCEDURE — 80076 HEPATIC FUNCTION PANEL: CPT

## 2025-04-17 PROCEDURE — 99285 EMERGENCY DEPT VISIT HI MDM: CPT

## 2025-04-17 PROCEDURE — 87641 MR-STAPH DNA AMP PROBE: CPT

## 2025-04-17 PROCEDURE — 93005 ELECTROCARDIOGRAM TRACING: CPT

## 2025-04-17 PROCEDURE — 85610 PROTHROMBIN TIME: CPT

## 2025-04-17 PROCEDURE — 80048 BASIC METABOLIC PNL TOTAL CA: CPT

## 2025-04-17 PROCEDURE — 85025 COMPLETE CBC W/AUTO DIFF WBC: CPT

## 2025-04-17 PROCEDURE — 84100 ASSAY OF PHOSPHORUS: CPT

## 2025-04-17 PROCEDURE — 86901 BLOOD TYPING SEROLOGIC RH(D): CPT

## 2025-04-17 PROCEDURE — 87640 STAPH A DNA AMP PROBE: CPT

## 2025-04-17 PROCEDURE — 99232 SBSQ HOSP IP/OBS MODERATE 35: CPT

## 2025-04-17 PROCEDURE — 81003 URINALYSIS AUTO W/O SCOPE: CPT

## 2025-04-17 PROCEDURE — 36415 COLL VENOUS BLD VENIPUNCTURE: CPT

## 2025-04-17 PROCEDURE — 85027 COMPLETE CBC AUTOMATED: CPT

## 2025-04-17 PROCEDURE — 86900 BLOOD TYPING SEROLOGIC ABO: CPT

## 2025-04-17 RX ORDER — ATORVASTATIN CALCIUM 80 MG/1
1 TABLET, FILM COATED ORAL
Refills: 0 | DISCHARGE

## 2025-04-17 RX ORDER — ATORVASTATIN CALCIUM 80 MG/1
1 TABLET, FILM COATED ORAL
Qty: 0 | Refills: 0 | DISCHARGE
Start: 2025-04-17

## 2025-04-17 RX ORDER — CYST/ALA/Q10/PHOS.SER/DHA/BROC 100-20-50
1 POWDER (GRAM) ORAL
Qty: 0 | Refills: 0 | DISCHARGE
Start: 2025-04-17

## 2025-04-17 RX ORDER — AMLODIPINE BESYLATE 10 MG/1
1 TABLET ORAL
Qty: 0 | Refills: 0 | DISCHARGE
Start: 2025-04-17

## 2025-04-17 RX ORDER — AMLODIPINE BESYLATE 10 MG/1
1 TABLET ORAL
Refills: 0 | DISCHARGE

## 2025-04-17 RX ORDER — TIMOLOL MALEATE 6.8 MG/ML
1 SOLUTION OPHTHALMIC
Qty: 0 | Refills: 0 | DISCHARGE
Start: 2025-04-17

## 2025-04-17 RX ORDER — SPIRONOLACTONE 25 MG
1 TABLET ORAL
Qty: 0 | Refills: 0 | DISCHARGE
Start: 2025-04-17

## 2025-04-17 RX ORDER — TIMOLOL MALEATE 6.8 MG/ML
1 SOLUTION OPHTHALMIC
Refills: 0 | DISCHARGE

## 2025-04-17 RX ORDER — TIMOLOL MALEATE 6.8 MG/ML
0 SOLUTION OPHTHALMIC
Refills: 0 | DISCHARGE

## 2025-04-17 RX ORDER — ENOXAPARIN SODIUM 100 MG/ML
40 INJECTION SUBCUTANEOUS EVERY 24 HOURS
Refills: 0 | Status: DISCONTINUED | OUTPATIENT
Start: 2025-04-17 | End: 2025-04-17

## 2025-04-17 RX ORDER — AMIODARONE HYDROCHLORIDE 50 MG/ML
1 INJECTION, SOLUTION INTRAVENOUS
Qty: 0 | Refills: 0 | DISCHARGE
Start: 2025-04-17

## 2025-04-17 RX ORDER — CALCIUM CARBONATE/VITAMIN D3 500MG-5MCG
1 TABLET ORAL
Qty: 0 | Refills: 0 | DISCHARGE
Start: 2025-04-17

## 2025-04-17 RX ADMIN — AMIODARONE HYDROCHLORIDE 200 MILLIGRAM(S): 50 INJECTION, SOLUTION INTRAVENOUS at 06:12

## 2025-04-17 RX ADMIN — Medication 1 TABLET(S): at 11:13

## 2025-04-17 RX ADMIN — AMLODIPINE BESYLATE 10 MILLIGRAM(S): 10 TABLET ORAL at 06:12

## 2025-04-17 RX ADMIN — TIMOLOL MALEATE 1 DROP(S): 6.8 SOLUTION OPHTHALMIC at 06:13

## 2025-04-17 RX ADMIN — Medication 25 MILLIGRAM(S): at 06:13

## 2025-04-17 RX ADMIN — Medication 1 APPLICATION(S): at 10:46

## 2025-04-17 RX ADMIN — Medication 10 MILLIGRAM(S): at 11:13

## 2025-04-17 NOTE — PHYSICAL THERAPY INITIAL EVALUATION ADULT - PERTINENT HX OF CURRENT PROBLEM, REHAB EVAL
74 year old female with a PMHx of AFib on Xarelto, HTN, GERD, RON, previous stroke/ TIAs, who presents after having a fall 2 days (+) head strike, (-) LOC, found to have a small falcine SDH. + R flank ecchymosis. transferred from [facility] to [facility]. Repeat CTH at 6pm 4/15 showing mildly larger R frontal SDH. Follow up scan 4/16 at 6 pm showing stable small SDH.

## 2025-04-17 NOTE — PHYSICAL THERAPY INITIAL EVALUATION ADULT - ADDITIONAL COMMENTS
Pt lives with her  in a house with 4 TC with one rail and no stairs that she needs to negotiate inside. Independent PTA and does not own DME.

## 2025-04-17 NOTE — DISCHARGE NOTE NURSING/CASE MANAGEMENT/SOCIAL WORK - PATIENT PORTAL LINK FT
You can access the FollowMyHealth Patient Portal offered by [facility] by registering at the following website: [email or web address]. By joining [employer]’s FollowMyHealth portal, you will also be able to view your health information using other applications (apps) compatible with our system.

## 2025-04-17 NOTE — PROGRESS NOTE ADULT - SUBJECTIVE AND OBJECTIVE BOX
Patient seen and examined . Comfortable , denies sob/chest pain , denies n/v , voiding , last BM 3 days ago , denies HA .     CC : no complaints       MEDICATIONS  (STANDING):  aMIOdarone    Tablet 200 milliGRAM(s) Oral daily  amLODIPine   Tablet 10 milliGRAM(s) Oral daily  atorvastatin 80 milliGRAM(s) Oral at bedtime  calcium carbonate 1250 mG  + Vitamin D (OsCal 500 + D) 1 Tablet(s) Oral daily  cetirizine 10 milliGRAM(s) Oral daily  chlorhexidine 2% Cloths 1 Application(s) Topical daily  enoxaparin Injectable 40 milliGRAM(s) SubCutaneous every 24 hours  influenza  Vaccine (HIGH DOSE) 0.5 milliLiter(s) IntraMuscular once  melatonin 5 milliGRAM(s) Oral at bedtime  multivitamin/minerals 1 Tablet(s) Oral daily  polyethylene glycol 3350 17 Gram(s) Oral daily  senna 2 Tablet(s) Oral at bedtime  spironolactone 25 milliGRAM(s) Oral daily  timolol 0.5% Solution 1 Drop(s) Both EYES two times a day    MEDICATIONS  (PRN):  acetaminophen   IVPB .. 1000 milliGRAM(s) IV Intermittent every 6 hours PRN Mild Pain (1 - 3), Moderate Pain (4 - 6), Severe Pain (7 - 10)      LABS:                          11.1   7.31  )-----------( 231      ( 17 Apr 2025 05:11 )             32.7     04-17    137  |  101  |  12.2  ----------------------------<  91  4.9   |  24.0  |  1.15    Ca    9.2      17 Apr 2025 05:11  Phos  3.5     04-17  Mg     2.3     04-17    TPro  6.2[L]  /  Alb  3.5  /  TBili  0.5  /  DBili  0.1  /  AST  35[H]  /  ALT  32  /  AlkPhos  72  04-16    PT/INR - ( 17 Apr 2025 05:11 )   PT: 14.9 sec;   INR: 1.29 ratio         PTT - ( 17 Apr 2025 05:11 )  PTT:31.9 sec        RADIOLOGY & ADDITIONAL TESTS:  < from: CT Head No Cont (04.16.25 @ 18:14) >    ACC: [id number] EXAM:  CT BRAIN   ORDERED BY: LIZBETH COHEN     PROCEDURE DATE:  04/16/2025        < end of copied text >  < from: CT Head No Cont (04.16.25 @ 18:14) >    IMPRESSION: Stable subdural hemorrhages. No new hemorrhage.    --- End of Report ---    < from: 12 Lead ECG (04.15.25 @ 22:41) >    Ventricular Rate 49 BPM    Atrial Rate 49 BPM    P-R Interval 230 ms    QRS Duration 94 ms    Q-T Interval 488 ms    QTC Calculation(Bazett) 440 ms    P Axis 72 degrees    R Axis 17 degrees    T Axis 99 degrees    Diagnosis Line Sinus bradycardia with 1st degree A-V block  Nonspecific ST and T wave abnormality  Abnormal ECG    Confirmed by Troy Key MD ([id number]) on 4/16/2025 7:32:30 AM    < end of copied text >    < from: 12 Lead ECG (12.20.24 @ 08:08) >    Ventricular Rate 54 BPM    Atrial Rate 54 BPM    P-R Interval 254 ms    QRS Duration 100 ms    Q-T Interval 470 ms    QTC Calculation(Bazett) 445 ms    P Axis 63 degrees    R Axis 59 degrees    T Axis 83 degrees    Diagnosis Line *** Poor data quality, interpretation may be adversely affected  Sinus bradycardia with 1st degree A-V block  Low voltage QRS  Otherwise normal ECG    < end of copied text >        < from: EDWIN W or WO Ultrasound Enhancing Agent (12.19.24 @ 13:05) >    TRANSESOPHAGEAL ECHOCARDIOGRAM REPORT  ________________________________________________________________________________                                      _______       Pt. Name:       Edita York Study Date:    12/19/2024  MRN:            [id number]     YOB: 1950  Accession #:    [id number]    Age:           74 years  Account#:       [id number]   Gender:        F  Heart Rate:                  Height:        59.00 in (149.86 cm)  Rhythm:                      Weight:        100.00lb (45.36 kg)  Blood Pressure: 74/60 mmHg   BSA/BMI:       1.37 m² / 20.20 kg/    < end of copied text >  < from: EDWIN W or WO Ultrasound Enhancing Agent (12.19.24 @ 13:05) >     CONCLUSIONS:      1. Left ventricular systolic function is normal with an ejection fraction visually estimated at 50 to 55 %.   2. Normal right ventricular cavity size and normal right ventricular systolicfunction.   3. Mild mitral regurgitation.   4. Agitated saline injection was negative for intracardiac shunt.   5. No evidence of left atrial or left atrial appendage thrombus. The left atrial appendage emptying velocity is low. Ultrasound enhancing agent was utilized to visualize the left atrial appendage.   6. Severe (grade 4) spontaneous echo contrast noted in the left atrial appendage and severe (grade 4) spontaneous echo contrast noted in the left atrium.    < end of copied text >      REVIEW OF SYSTEMS:    All systems are reviewed and are negative .     I&O's Summary        Vital Signs Last 24 Hrs  T(C): 36.8 (17 Apr 2025 10:34), Max: 37.2 (17 Apr 2025 00:00)  T(F): 98.3 (17 Apr 2025 10:34), Max: 98.9 (17 Apr 2025 00:00)  HR: 54 (17 Apr 2025 10:34) (46 - 72)  BP: 111/61 (17 Apr 2025 10:34) (96/60 - 132/74)  BP(mean): 73 (16 Apr 2025 22:00) (73 - 73)  RR: 18 (17 Apr 2025 10:34) (16 - 19)  SpO2: 95% (17 Apr 2025 10:34) (95% - 100%)    Parameters below as of 17 Apr 2025 10:34  Patient On (Oxygen Delivery Method): room air      PHYSICAL EXAM:    GENERAL: NAD,   HEAD:  Atraumatic, Normocephalic  EYES: EOMI, PERRLA, conjunctiva and sclera clear  NECK: Supple, No JVD, Normal thyroid  NERVOUS SYSTEM:  Alert & Oriented X3, no focal deficit  CHEST/LUNG: CTA b/l ,  no  rales, rhonchi, wheezing, or rubs  HEART: Regular rate and rhythm; No murmurs, rubs, or gallops  ABDOMEN: Soft, Nontender, Nondistended; Bowel sounds present  EXTREMITIES:  2+ Peripheral Pulses, No clubbing, cyanosis, or edema  LYMPH: No lymphadenopathy noted  SKIN: No rashes or lesions  R flank ecchymosis +

## 2025-04-17 NOTE — DISCHARGE NOTE NURSING/CASE MANAGEMENT/SOCIAL WORK - NSDCPEFALRISK_GEN_ALL_CORE
For information on Fall & Injury Prevention, visit: [email or web address] OR  [email or web address] OR  https://www.cdc.gov/steadi/patient.html

## 2025-04-17 NOTE — PROGRESS NOTE ADULT - SUBJECTIVE AND OBJECTIVE BOX
SUBJECTIVE:     INTERVAL HX/OVERNIGHT EVENTS:  Patient seen and examined at bedside. Denies chest pain, shortness of breath, nausea/vomiting. Last BM:     Vital Signs Last 24 Hrs  T(C): 36.8 (04-17-25 @ 10:34), Max: 37.2 (04-17-25 @ 00:00)  T(F): 98.3 (04-17-25 @ 10:34), Max: 98.9 (04-17-25 @ 00:00)  HR: 54 (04-17-25 @ 10:34) (46 - 72)  BP: 111/61 (04-17-25 @ 10:34) (96/60 - 132/74)  BP(mean): 73 (04-16-25 @ 22:00) (73 - 73)  RR: 18 (04-17-25 @ 10:34) (16 - 19)  SpO2: 95% (04-17-25 @ 10:34) (95% - 100%)    PHYSICAL EXAM:    GENERAL: NAD    INCISION: Left incision site, no signs of infection     DRAINS:    HEAD: normocephalic   MENTAL STATUS: AAO x 3, conversant, following simple commands    CRANIAL NERVES: PERRL, EOMI without nystagmus. Face symmetric, Tongue is midline. Hearing grossly intact. Head turning and shoulder shrug intact.    REFLEXES: No pronator drift   MOTOR: strength 5/5 b/l upper and lower extremities   SENSATION: grossly intact to light touch all extremities   SKIN: Warm, dry    LABS:                          11.1   7.31  )-----------( 231      ( 17 Apr 2025 05:11 )             32.7    04-17    137  |  101  |  12.2  ----------------------------<  91  4.9   |  24.0  |  1.15    Ca    9.2      17 Apr 2025 05:11  Phos  3.5     04-17  Mg     2.3     04-17    TPro  6.2[L]  /  Alb  3.5  /  TBili  0.5  /  DBili  0.1  /  AST  35[H]  /  ALT  32  /  AlkPhos  72  04-16  PT/INR - ( 17 Apr 2025 05:11 )   PT: 14.9 sec;   INR: 1.29 ratio         PTT - ( 17 Apr 2025 05:11 )  PTT:31.9 sec      IMAGING:            SUBJECTIVE:   73 yo female with a pmhx of HTN, A. Fib, Stroke on Xarelto last taken was today, PSH of  who reports a fall 2 days ago. Patient states that she was tying her soon and fell backwards. (-) LOC, (+) Head strike. Patient states that she presented to the ED today because of a black and blue on her back. She denies any headaches, vision changes, weakness in her arms or legs or any other complaints.     INTERVAL HX/OVERNIGHT EVENTS:  Patient seen and examined at bedside. Denies any acute complaints. 24h CTH stable. Dispo pending PT eval.      Vital Signs Last 24 Hrs  T(C): 36.8 (- @ 10:34), Max: 37.2 (- @ 00:00)  T(F): 98.3 (--25 @ 10:34), Max: 98.9 (- @ 00:00)  HR: 54 (- @ 10:34) (46 - 72)  BP: 111/61 (-17-25 @ 10:34) (96/60 - 132/74)  BP(mean): 73 (-16-25 @ 22:00) (73 - 73)  RR: 18 (--25 @ 10:34) (16 - 19)  SpO2: 95% (--25 @ 10:34) (95% - 100%)    PHYSICAL EXAM:    GENERAL: NAD    HEAD: normocephalic   MENTAL STATUS: AAO x 3, conversant, following simple commands    CRANIAL NERVES: PERRL, EOMI without nystagmus. Face symmetric, Tongue is midline. Hearing grossly intact. Head turning and shoulder shrug intact.    REFLEXES: No pronator drift   MOTOR: strength 5/5 b/l upper and lower extremities   SENSATION: grossly intact to light touch all extremities   SKIN: Warm, dry    LABS:                          11.1   7.31  )-----------( 231      ( 2025 05:11 )             32.7        137  |  101  |  12.2  ----------------------------<  91  4.9   |  24.0  |  1.15    Ca    9.2      2025 05:11  Phos  3.5     04-  Mg     2.3     -17    TPro  6.2[L]  /  Alb  3.5  /  TBili  0.5  /  DBili  0.1  /  AST  35[H]  /  ALT  32  /  AlkPhos  72  04-16  PT/INR - ( 2025 05:11 )   PT: 14.9 sec;   INR: 1.29 ratio         PTT - ( 2025 05:11 )  PTT:31.9 sec      IMAGING:     CT Head No Cont (25 @ 18:14)   IMPRESSION: Stable subdural hemorrhages. No new hemorrhage.

## 2025-04-17 NOTE — PROGRESS NOTE ADULT - ASSESSMENT
ASSESSMENT:    -24h CTH stable  PLAN:    -Okay for Q4h neuro checks, q4 vitals in house   -Telemetry bed  -Reviewed imaging  -STAT CTH for any changes in neuro exam  -Pain control prn, avoid oversedation  -No AEDs required  -Continue to hold Xarelto until outpatient f/u in 2 weeks  -Can f/u with Dr. Vega as an outpatient in 2 weeks  -VTE prophylaxis: SCDs, lovenox tonight if still inhouse   -Medicine following, spoke with andrade Oliveros to d/c from medicine standpoint  -Dispo pending PT eval, PT to see today  -Discussed case with Dr. Lezama ASSESSMENT:  74 year old female with a PMHx of AFib on Xarelto, HTN, GERD, RON, previous stroke/ TIAs, who presents after having a fall 2 days (+) head strike, (-) LOC, found to have a small falcine SDH. + R flank ecchymosis. [place] [facility].  -Repeat CTH at 6pm 4/15 showing mildly larger R frontal SDH. -Follow up scan 4/16 showing stable small SDH.  -24h CTH stable  PLAN:    -Okay for Q4h neuro checks, q4 vitals in house   -Telemetry bed  -Reviewed imaging  -STAT CTH for any changes in neuro exam  -Pain control prn, avoid oversedation  -No AEDs required  -Continue to hold Xarelto until outpatient f/u in 2 weeks  -Can f/u with Dr. Vega as an outpatient in 2 weeks  -VTE prophylaxis: SCDs, lovenox tonight if still inhouse   -Medicine following, spoke with andrade Oliveros to d/c from medicine standpoint  -Dispo pending PT eval, PT to see today, possible d/c home today   -Discussed case with Dr. Vega ASSESSMENT:  74 year old female with a PMHx of AFib on Xarelto, HTN, GERD, RON, previous stroke/ TIAs, who presents after having a fall 2 days (+) head strike, (-) LOC, found to have a small falcine SDH. + R flank ecchymosis. transferred from [facility] to [facility].  -Repeat CTH at 6pm 4/15 showing mildly larger R frontal SDH. -Follow up scan 4/16 showing stable small SDH.  -24h CTH stable    PLAN:    -Okay for Q4h neuro checks, q4 vitals in house   -Telemetry bed  -Reviewed imaging  -STAT CTH for any changes in neuro exam  -Pain control prn, avoid oversedation  -No AEDs required  -Continue to hold Xarelto until outpatient f/u in 2 weeks  -Can f/u with Dr. Vega as an outpatient in 2 weeks  -VTE prophylaxis: SCDs, lovenox tonight if still inhouse   -Medicine following, mild bradycardia overnight, asymptomatic, can f/u with cardiology as an outpatient, spoke with andrade Oliveros to d/c from medicine standpoint  -Dispo pending PT eval, PT to see today, possible d/c home today   -Discussed case with Dr. Vega ASSESSMENT:  74 year old female with a PMHx of AFib on Xarelto, HTN, GERD, RON, previous stroke/ TIAs, who presents after having a fall 2 days (+) head strike, (-) LOC, found to have a small falcine SDH. + R flank ecchymosis. [place] [facility] [place] [facility].  -Repeat CTH at 6pm 4/15 showing mildly larger R frontal SDH. -Follow up scan 4/16 showing stable small SDH.  -24h CTH stable    PLAN:    -Okay for Q4h neuro checks, q4 vitals in house   -Telemetry bed  -Reviewed imaging  -STAT CTH for any changes in neuro exam  -Pain control prn, avoid oversedation  -No AEDs required  -Continue to hold Xarelto, aspirin, NSAIDs, blood thinners, fish oil, supplements until outpatient f/u in 2 weeks  -Can f/u with Dr. Vega as an outpatient in 2 weeks  -VTE prophylaxis: SCDs, lovenox tonight if still inhouse   -Medicine following, mild bradycardia overnight, asymptomatic, can f/u with cardiology as an outpatient, spoke with andrade Oliveros to d/c from medicine standpoint  -Dispo pending PT eval, PT to see today, possible d/c home today   -Discussed case with Dr. Vega

## 2025-04-17 NOTE — PROGRESS NOTE ADULT - ASSESSMENT
74 year old female with a PMHx of AFib on Xarelto, HTN, GERD, RON, previous stroke/ TIAs, who presents after having a fall 2 days (+) head strike, (-) LOC, found to have a small falcine SDH. + R flank ecchymosis. [place] [facility].  Repeat CTH at 6pm 4/15 showing mildly larger R frontal SDH. Follow up scan 4/16 at 6 pm showing stable small SDH.    PLAN:  R SDH after mechanical fall - stable   -  neuro checks as per NS   - AED: not indicated   - Pain control prn: tylenol prn  - continue melatonin at bedtime for insomnia  - PT- EVAL     Hx of PAF SBP goal 100-160  - continue home amiodarone 200mg QD for rate control ,   cardiac monitoring reviewed by me , nonsustained bradycardia episode while asleep at 4 am at low 40's , now HR in 60's .   Bradycardia noted on prior EKG from 2024 . Patient not on any BB. May f/u with own cardiologist as on outpatient   Hold Xarelto for now ,restart once OK with NS .    HTN - continue Amlodipine Spironolactone with parameters     HLD: continue statin  home dose     Hx of TIA/ CVA - continue statin     Hx of cataract - continue home eye drops   dvt prophylaxis - as primary team     Patient is medically stable for discharge once cleared by PT/NS .

## 2025-04-17 NOTE — DISCHARGE NOTE NURSING/CASE MANAGEMENT/SOCIAL WORK - FINANCIAL ASSISTANCE
[facility] provides services at a reduced cost to those who are determined to be eligible through [facility]’s financial assistance program. Information regarding [facility]’s financial assistance program can be found by going to [email or web address] or by calling [phone].

## 2025-04-30 ENCOUNTER — APPOINTMENT (OUTPATIENT)
Dept: CT IMAGING | Facility: CLINIC | Age: 75
End: 2025-04-30
Payer: MEDICARE

## 2025-04-30 PROCEDURE — 70450 CT HEAD/BRAIN W/O DYE: CPT

## 2025-05-01 ENCOUNTER — NON-APPOINTMENT (OUTPATIENT)
Age: 75
End: 2025-05-01

## 2025-05-02 ENCOUNTER — NON-APPOINTMENT (OUTPATIENT)
Age: 75
End: 2025-05-02

## 2025-05-08 ENCOUNTER — APPOINTMENT (OUTPATIENT)
Dept: NEUROSURGERY | Facility: CLINIC | Age: 75
End: 2025-05-08
Payer: MEDICARE

## 2025-05-08 VITALS
WEIGHT: 108 LBS | TEMPERATURE: 97.6 F | HEART RATE: 54 BPM | HEIGHT: 59 IN | SYSTOLIC BLOOD PRESSURE: 138 MMHG | DIASTOLIC BLOOD PRESSURE: 68 MMHG | BODY MASS INDEX: 21.77 KG/M2 | OXYGEN SATURATION: 98 %

## 2025-05-08 PROCEDURE — 99212 OFFICE O/P EST SF 10 MIN: CPT

## 2025-05-09 ENCOUNTER — NON-APPOINTMENT (OUTPATIENT)
Age: 75
End: 2025-05-09

## 2025-05-09 ENCOUNTER — APPOINTMENT (OUTPATIENT)
Dept: ELECTROPHYSIOLOGY | Facility: CLINIC | Age: 75
End: 2025-05-09
Payer: MEDICARE

## 2025-05-09 VITALS
HEART RATE: 56 BPM | SYSTOLIC BLOOD PRESSURE: 120 MMHG | BODY MASS INDEX: 20.8 KG/M2 | DIASTOLIC BLOOD PRESSURE: 64 MMHG | WEIGHT: 103 LBS | OXYGEN SATURATION: 98 %

## 2025-05-09 DIAGNOSIS — I61.9 NONTRAUMATIC INTRACEREBRAL HEMORRHAGE, UNSPECIFIED: ICD-10-CM

## 2025-05-09 DIAGNOSIS — Z98.890 OTHER SPECIFIED POSTPROCEDURAL STATES: ICD-10-CM

## 2025-05-09 DIAGNOSIS — Z86.79 OTHER SPECIFIED POSTPROCEDURAL STATES: ICD-10-CM

## 2025-05-09 PROCEDURE — 99214 OFFICE O/P EST MOD 30 MIN: CPT | Mod: 25

## 2025-05-09 PROCEDURE — 93000 ELECTROCARDIOGRAM COMPLETE: CPT

## 2025-05-19 ENCOUNTER — TRANSCRIPTION ENCOUNTER (OUTPATIENT)
Age: 75
End: 2025-05-19

## 2025-05-19 ENCOUNTER — APPOINTMENT (OUTPATIENT)
Dept: OPHTHALMOLOGY | Facility: HOSPITAL | Age: 75
End: 2025-05-19
Payer: MEDICARE

## 2025-05-19 ENCOUNTER — NON-APPOINTMENT (OUTPATIENT)
Age: 75
End: 2025-05-19

## 2025-05-19 PROCEDURE — 66984 XCAPSL CTRC RMVL W/O ECP: CPT | Mod: RT

## 2025-05-20 ENCOUNTER — NON-APPOINTMENT (OUTPATIENT)
Age: 75
End: 2025-05-20

## 2025-05-20 ENCOUNTER — APPOINTMENT (OUTPATIENT)
Dept: OPHTHALMOLOGY | Facility: CLINIC | Age: 75
End: 2025-05-20
Payer: MEDICARE

## 2025-05-20 PROCEDURE — 99024 POSTOP FOLLOW-UP VISIT: CPT

## 2025-05-27 ENCOUNTER — APPOINTMENT (OUTPATIENT)
Dept: CT IMAGING | Facility: CLINIC | Age: 75
End: 2025-05-27
Payer: MEDICARE

## 2025-05-27 PROCEDURE — 70450 CT HEAD/BRAIN W/O DYE: CPT

## 2025-05-29 ENCOUNTER — APPOINTMENT (OUTPATIENT)
Dept: OPHTHALMOLOGY | Facility: CLINIC | Age: 75
End: 2025-05-29
Payer: MEDICARE

## 2025-05-29 ENCOUNTER — NON-APPOINTMENT (OUTPATIENT)
Age: 75
End: 2025-05-29

## 2025-05-29 PROCEDURE — 99213 OFFICE O/P EST LOW 20 MIN: CPT | Mod: 24

## 2025-05-29 PROCEDURE — 92136 OPHTHALMIC BIOMETRY: CPT | Mod: 26,LT

## 2025-06-04 ENCOUNTER — APPOINTMENT (OUTPATIENT)
Dept: NEUROSURGERY | Facility: CLINIC | Age: 75
End: 2025-06-04
Payer: MEDICARE

## 2025-06-04 DIAGNOSIS — I61.9 NONTRAUMATIC INTRACEREBRAL HEMORRHAGE, UNSPECIFIED: ICD-10-CM

## 2025-06-04 PROCEDURE — 99212 OFFICE O/P EST SF 10 MIN: CPT | Mod: 95

## 2025-06-12 ENCOUNTER — LABORATORY RESULT (OUTPATIENT)
Age: 75
End: 2025-06-12

## 2025-06-17 ENCOUNTER — APPOINTMENT (OUTPATIENT)
Dept: CARDIOLOGY | Facility: CLINIC | Age: 75
End: 2025-06-17
Payer: MEDICARE

## 2025-06-17 VITALS
BODY MASS INDEX: 20.6 KG/M2 | OXYGEN SATURATION: 98 % | WEIGHT: 102 LBS | HEART RATE: 61 BPM | DIASTOLIC BLOOD PRESSURE: 60 MMHG | SYSTOLIC BLOOD PRESSURE: 132 MMHG

## 2025-06-17 PROCEDURE — G2211 COMPLEX E/M VISIT ADD ON: CPT

## 2025-06-17 PROCEDURE — 99214 OFFICE O/P EST MOD 30 MIN: CPT

## 2025-06-17 RX ORDER — EZETIMIBE 10 MG/1
10 TABLET ORAL
Qty: 90 | Refills: 3 | Status: ACTIVE | COMMUNITY
Start: 2025-06-17 | End: 1900-01-01

## 2025-06-17 RX ORDER — RIVAROXABAN 20 MG/1
20 TABLET, FILM COATED ORAL
Refills: 3 | Status: ACTIVE | COMMUNITY
Start: 2025-06-17

## 2025-06-23 ENCOUNTER — NON-APPOINTMENT (OUTPATIENT)
Age: 75
End: 2025-06-23

## 2025-06-23 ENCOUNTER — TRANSCRIPTION ENCOUNTER (OUTPATIENT)
Age: 75
End: 2025-06-23

## 2025-06-23 ENCOUNTER — APPOINTMENT (OUTPATIENT)
Dept: OPHTHALMOLOGY | Facility: HOSPITAL | Age: 75
End: 2025-06-23
Payer: MEDICARE

## 2025-06-23 PROCEDURE — 66984 XCAPSL CTRC RMVL W/O ECP: CPT | Mod: 79,LT

## 2025-06-24 ENCOUNTER — APPOINTMENT (OUTPATIENT)
Dept: OPHTHALMOLOGY | Facility: CLINIC | Age: 75
End: 2025-06-24
Payer: MEDICARE

## 2025-06-24 ENCOUNTER — NON-APPOINTMENT (OUTPATIENT)
Age: 75
End: 2025-06-24

## 2025-06-24 PROCEDURE — 99024 POSTOP FOLLOW-UP VISIT: CPT

## 2025-07-02 ENCOUNTER — APPOINTMENT (OUTPATIENT)
Dept: OPHTHALMOLOGY | Facility: CLINIC | Age: 75
End: 2025-07-02
Payer: MEDICARE

## 2025-07-02 ENCOUNTER — NON-APPOINTMENT (OUTPATIENT)
Age: 75
End: 2025-07-02

## 2025-07-02 PROCEDURE — 99024 POSTOP FOLLOW-UP VISIT: CPT

## 2025-07-25 ENCOUNTER — APPOINTMENT (OUTPATIENT)
Dept: OPHTHALMOLOGY | Facility: CLINIC | Age: 75
End: 2025-07-25

## 2025-08-05 ENCOUNTER — APPOINTMENT (OUTPATIENT)
Dept: CARDIOLOGY | Facility: CLINIC | Age: 75
End: 2025-08-05

## 2025-08-05 ENCOUNTER — APPOINTMENT (OUTPATIENT)
Dept: NEUROLOGY | Facility: CLINIC | Age: 75
End: 2025-08-05

## 2025-08-09 ENCOUNTER — RX RENEWAL (OUTPATIENT)
Age: 75
End: 2025-08-09

## 2025-08-11 ENCOUNTER — RX RENEWAL (OUTPATIENT)
Age: 75
End: 2025-08-11

## 2025-08-11 ENCOUNTER — APPOINTMENT (OUTPATIENT)
Dept: NEUROLOGY | Facility: CLINIC | Age: 75
End: 2025-08-11
Payer: MEDICARE

## 2025-08-11 VITALS
BODY MASS INDEX: 20.76 KG/M2 | HEART RATE: 52 BPM | SYSTOLIC BLOOD PRESSURE: 122 MMHG | OXYGEN SATURATION: 98 % | WEIGHT: 103 LBS | DIASTOLIC BLOOD PRESSURE: 72 MMHG | HEIGHT: 59 IN

## 2025-08-11 DIAGNOSIS — Z86.79 PERSONAL HISTORY OF OTHER DISEASES OF THE CIRCULATORY SYSTEM: ICD-10-CM

## 2025-08-11 DIAGNOSIS — G45.9 TRANSIENT CEREBRAL ISCHEMIC ATTACK, UNSPECIFIED: ICD-10-CM

## 2025-08-11 DIAGNOSIS — R26.81 UNSTEADINESS ON FEET: ICD-10-CM

## 2025-08-11 PROCEDURE — 99205 OFFICE O/P NEW HI 60 MIN: CPT

## 2025-08-22 ENCOUNTER — APPOINTMENT (OUTPATIENT)
Dept: OPHTHALMOLOGY | Facility: CLINIC | Age: 75
End: 2025-08-22
Payer: MEDICARE

## 2025-08-22 ENCOUNTER — NON-APPOINTMENT (OUTPATIENT)
Age: 75
End: 2025-08-22

## 2025-08-22 PROCEDURE — 99024 POSTOP FOLLOW-UP VISIT: CPT
